# Patient Record
Sex: MALE | Race: WHITE | NOT HISPANIC OR LATINO | Employment: FULL TIME | URBAN - METROPOLITAN AREA
[De-identification: names, ages, dates, MRNs, and addresses within clinical notes are randomized per-mention and may not be internally consistent; named-entity substitution may affect disease eponyms.]

---

## 2023-08-16 ENCOUNTER — HOSPITAL ENCOUNTER (INPATIENT)
Facility: MEDICAL CENTER | Age: 53
LOS: 5 days | DRG: 908 | End: 2023-08-21
Attending: EMERGENCY MEDICINE | Admitting: INTERNAL MEDICINE
Payer: COMMERCIAL

## 2023-08-16 ENCOUNTER — APPOINTMENT (OUTPATIENT)
Dept: RADIOLOGY | Facility: MEDICAL CENTER | Age: 53
DRG: 908 | End: 2023-08-16
Attending: EMERGENCY MEDICINE
Payer: COMMERCIAL

## 2023-08-16 DIAGNOSIS — I46.9 CARDIAC ARREST (HCC): ICD-10-CM

## 2023-08-16 DIAGNOSIS — I50.20 HFREF (HEART FAILURE WITH REDUCED EJECTION FRACTION) (HCC): ICD-10-CM

## 2023-08-16 DIAGNOSIS — L03.90 CELLULITIS, UNSPECIFIED CELLULITIS SITE: ICD-10-CM

## 2023-08-16 DIAGNOSIS — T82.7XXA PACEMAKER INFECTION, INITIAL ENCOUNTER (HCC): ICD-10-CM

## 2023-08-16 PROBLEM — N18.30 CHRONIC KIDNEY DISEASE (CKD), STAGE III (MODERATE): Status: ACTIVE | Noted: 2023-08-16

## 2023-08-16 PROBLEM — I10 HTN (HYPERTENSION): Status: ACTIVE | Noted: 2023-08-16

## 2023-08-16 PROBLEM — E87.1 HYPONATREMIA: Status: ACTIVE | Noted: 2023-08-16

## 2023-08-16 PROBLEM — Z71.89 ACP (ADVANCE CARE PLANNING): Status: ACTIVE | Noted: 2023-08-16

## 2023-08-16 LAB
ALBUMIN SERPL BCP-MCNC: 3.3 G/DL (ref 3.2–4.9)
ALBUMIN/GLOB SERPL: 1 G/DL
ALP SERPL-CCNC: 78 U/L (ref 30–99)
ALT SERPL-CCNC: 19 U/L (ref 2–50)
ANION GAP SERPL CALC-SCNC: 14 MMOL/L (ref 7–16)
AST SERPL-CCNC: 21 U/L (ref 12–45)
BASOPHILS # BLD AUTO: 0.3 % (ref 0–1.8)
BASOPHILS # BLD: 0.04 K/UL (ref 0–0.12)
BILIRUB SERPL-MCNC: 0.7 MG/DL (ref 0.1–1.5)
BUN SERPL-MCNC: 29 MG/DL (ref 8–22)
CALCIUM ALBUM COR SERPL-MCNC: 9.3 MG/DL (ref 8.5–10.5)
CALCIUM SERPL-MCNC: 8.7 MG/DL (ref 8.5–10.5)
CHLORIDE SERPL-SCNC: 97 MMOL/L (ref 96–112)
CO2 SERPL-SCNC: 17 MMOL/L (ref 20–33)
CREAT SERPL-MCNC: 1.03 MG/DL (ref 0.5–1.4)
CRP SERPL HS-MCNC: 15.36 MG/DL (ref 0–0.75)
EOSINOPHIL # BLD AUTO: 0.07 K/UL (ref 0–0.51)
EOSINOPHIL NFR BLD: 0.5 % (ref 0–6.9)
ERYTHROCYTE [DISTWIDTH] IN BLOOD BY AUTOMATED COUNT: 38.4 FL (ref 35.9–50)
ERYTHROCYTE [SEDIMENTATION RATE] IN BLOOD BY WESTERGREN METHOD: 16 MM/HOUR (ref 0–20)
GFR SERPLBLD CREATININE-BSD FMLA CKD-EPI: 86 ML/MIN/1.73 M 2
GLOBULIN SER CALC-MCNC: 3.3 G/DL (ref 1.9–3.5)
GLUCOSE SERPL-MCNC: 70 MG/DL (ref 65–99)
GRAM STN SPEC: NORMAL
HCT VFR BLD AUTO: 47.2 % (ref 42–52)
HGB BLD-MCNC: 15.8 G/DL (ref 14–18)
IMM GRANULOCYTES # BLD AUTO: 0.07 K/UL (ref 0–0.11)
IMM GRANULOCYTES NFR BLD AUTO: 0.5 % (ref 0–0.9)
LACTATE SERPL-SCNC: 0.8 MMOL/L (ref 0.5–2)
LYMPHOCYTES # BLD AUTO: 2.22 K/UL (ref 1–4.8)
LYMPHOCYTES NFR BLD: 17.4 % (ref 22–41)
MCH RBC QN AUTO: 28.7 PG (ref 27–33)
MCHC RBC AUTO-ENTMCNC: 33.5 G/DL (ref 32.3–36.5)
MCV RBC AUTO: 85.8 FL (ref 81.4–97.8)
MONOCYTES # BLD AUTO: 1.44 K/UL (ref 0–0.85)
MONOCYTES NFR BLD AUTO: 11.3 % (ref 0–13.4)
NEUTROPHILS # BLD AUTO: 8.95 K/UL (ref 1.82–7.42)
NEUTROPHILS NFR BLD: 70 % (ref 44–72)
NRBC # BLD AUTO: 0 K/UL
NRBC BLD-RTO: 0 /100 WBC (ref 0–0.2)
NT-PROBNP SERPL IA-MCNC: <36 PG/ML (ref 0–125)
PLATELET # BLD AUTO: 201 K/UL (ref 164–446)
PMV BLD AUTO: 9.3 FL (ref 9–12.9)
POTASSIUM SERPL-SCNC: 4.3 MMOL/L (ref 3.6–5.5)
PROT SERPL-MCNC: 6.6 G/DL (ref 6–8.2)
RBC # BLD AUTO: 5.5 M/UL (ref 4.7–6.1)
SCCMEC + MECA PNL NOSE NAA+PROBE: NEGATIVE
SIGNIFICANT IND 70042: NORMAL
SITE SITE: NORMAL
SODIUM SERPL-SCNC: 128 MMOL/L (ref 135–145)
SOURCE SOURCE: NORMAL
WBC # BLD AUTO: 12.8 K/UL (ref 4.8–10.8)

## 2023-08-16 PROCEDURE — 96365 THER/PROPH/DIAG IV INF INIT: CPT

## 2023-08-16 PROCEDURE — 700105 HCHG RX REV CODE 258: Performed by: EMERGENCY MEDICINE

## 2023-08-16 PROCEDURE — 83880 ASSAY OF NATRIURETIC PEPTIDE: CPT

## 2023-08-16 PROCEDURE — 80053 COMPREHEN METABOLIC PANEL: CPT

## 2023-08-16 PROCEDURE — 87205 SMEAR GRAM STAIN: CPT

## 2023-08-16 PROCEDURE — 99223 1ST HOSP IP/OBS HIGH 75: CPT | Performed by: INTERNAL MEDICINE

## 2023-08-16 PROCEDURE — 87077 CULTURE AEROBIC IDENTIFY: CPT

## 2023-08-16 PROCEDURE — 770020 HCHG ROOM/CARE - TELE (206)

## 2023-08-16 PROCEDURE — 93005 ELECTROCARDIOGRAM TRACING: CPT | Performed by: EMERGENCY MEDICINE

## 2023-08-16 PROCEDURE — 83605 ASSAY OF LACTIC ACID: CPT

## 2023-08-16 PROCEDURE — 700111 HCHG RX REV CODE 636 W/ 250 OVERRIDE (IP): Mod: JZ | Performed by: EMERGENCY MEDICINE

## 2023-08-16 PROCEDURE — 700102 HCHG RX REV CODE 250 W/ 637 OVERRIDE(OP)

## 2023-08-16 PROCEDURE — 99497 ADVNCD CARE PLAN 30 MIN: CPT | Performed by: INTERNAL MEDICINE

## 2023-08-16 PROCEDURE — 700105 HCHG RX REV CODE 258: Performed by: NURSE PRACTITIONER

## 2023-08-16 PROCEDURE — 85652 RBC SED RATE AUTOMATED: CPT

## 2023-08-16 PROCEDURE — 87040 BLOOD CULTURE FOR BACTERIA: CPT

## 2023-08-16 PROCEDURE — 36415 COLL VENOUS BLD VENIPUNCTURE: CPT

## 2023-08-16 PROCEDURE — A9270 NON-COVERED ITEM OR SERVICE: HCPCS | Performed by: INTERNAL MEDICINE

## 2023-08-16 PROCEDURE — 87641 MR-STAPH DNA AMP PROBE: CPT

## 2023-08-16 PROCEDURE — A9270 NON-COVERED ITEM OR SERVICE: HCPCS

## 2023-08-16 PROCEDURE — 85025 COMPLETE CBC W/AUTO DIFF WBC: CPT

## 2023-08-16 PROCEDURE — 700111 HCHG RX REV CODE 636 W/ 250 OVERRIDE (IP): Mod: JZ | Performed by: NURSE PRACTITIONER

## 2023-08-16 PROCEDURE — 71045 X-RAY EXAM CHEST 1 VIEW: CPT

## 2023-08-16 PROCEDURE — 99285 EMERGENCY DEPT VISIT HI MDM: CPT

## 2023-08-16 PROCEDURE — 86140 C-REACTIVE PROTEIN: CPT

## 2023-08-16 PROCEDURE — 87070 CULTURE OTHR SPECIMN AEROBIC: CPT

## 2023-08-16 PROCEDURE — 700102 HCHG RX REV CODE 250 W/ 637 OVERRIDE(OP): Performed by: INTERNAL MEDICINE

## 2023-08-16 RX ORDER — ONDANSETRON 2 MG/ML
4 INJECTION INTRAMUSCULAR; INTRAVENOUS EVERY 4 HOURS PRN
Status: DISCONTINUED | OUTPATIENT
Start: 2023-08-16 | End: 2023-08-21 | Stop reason: HOSPADM

## 2023-08-16 RX ORDER — MELOXICAM 15 MG/1
15 TABLET ORAL EVERY MORNING
COMMUNITY

## 2023-08-16 RX ORDER — ATORVASTATIN CALCIUM 40 MG/1
40 TABLET, FILM COATED ORAL NIGHTLY
Status: DISCONTINUED | OUTPATIENT
Start: 2023-08-16 | End: 2023-08-21 | Stop reason: HOSPADM

## 2023-08-16 RX ORDER — LISINOPRIL 40 MG/1
40 TABLET ORAL EVERY MORNING
Status: DISCONTINUED | OUTPATIENT
Start: 2023-08-17 | End: 2023-08-16

## 2023-08-16 RX ORDER — CARVEDILOL 12.5 MG/1
12.5 TABLET ORAL 2 TIMES DAILY WITH MEALS
Status: DISCONTINUED | OUTPATIENT
Start: 2023-08-16 | End: 2023-08-21 | Stop reason: HOSPADM

## 2023-08-16 RX ORDER — PROCHLORPERAZINE EDISYLATE 5 MG/ML
5-10 INJECTION INTRAMUSCULAR; INTRAVENOUS EVERY 4 HOURS PRN
Status: DISCONTINUED | OUTPATIENT
Start: 2023-08-16 | End: 2023-08-21 | Stop reason: HOSPADM

## 2023-08-16 RX ORDER — ONDANSETRON 4 MG/1
4 TABLET, ORALLY DISINTEGRATING ORAL EVERY 4 HOURS PRN
Status: DISCONTINUED | OUTPATIENT
Start: 2023-08-16 | End: 2023-08-21 | Stop reason: HOSPADM

## 2023-08-16 RX ORDER — FUROSEMIDE 20 MG/1
20 TABLET ORAL EVERY MORNING
COMMUNITY

## 2023-08-16 RX ORDER — AMOXICILLIN 250 MG
2 CAPSULE ORAL 2 TIMES DAILY
Status: DISCONTINUED | OUTPATIENT
Start: 2023-08-16 | End: 2023-08-21 | Stop reason: HOSPADM

## 2023-08-16 RX ORDER — OXYCODONE HYDROCHLORIDE 10 MG/1
10 TABLET ORAL
Status: DISCONTINUED | OUTPATIENT
Start: 2023-08-16 | End: 2023-08-21 | Stop reason: HOSPADM

## 2023-08-16 RX ORDER — ACETAMINOPHEN 500 MG
1000 TABLET ORAL EVERY 6 HOURS PRN
Status: DISCONTINUED | OUTPATIENT
Start: 2023-08-22 | End: 2023-08-21 | Stop reason: HOSPADM

## 2023-08-16 RX ORDER — LISINOPRIL 40 MG/1
40 TABLET ORAL EVERY MORNING
COMMUNITY

## 2023-08-16 RX ORDER — HYDRALAZINE HYDROCHLORIDE 20 MG/ML
10 INJECTION INTRAMUSCULAR; INTRAVENOUS EVERY 4 HOURS PRN
Status: DISCONTINUED | OUTPATIENT
Start: 2023-08-16 | End: 2023-08-21 | Stop reason: HOSPADM

## 2023-08-16 RX ORDER — PROMETHAZINE HYDROCHLORIDE 25 MG/1
12.5-25 TABLET ORAL EVERY 4 HOURS PRN
Status: DISCONTINUED | OUTPATIENT
Start: 2023-08-16 | End: 2023-08-21 | Stop reason: HOSPADM

## 2023-08-16 RX ORDER — SPIRONOLACTONE 25 MG/1
25 TABLET ORAL DAILY
COMMUNITY
End: 2023-09-06

## 2023-08-16 RX ORDER — PROMETHAZINE HYDROCHLORIDE 25 MG/1
12.5-25 SUPPOSITORY RECTAL EVERY 4 HOURS PRN
Status: DISCONTINUED | OUTPATIENT
Start: 2023-08-16 | End: 2023-08-21 | Stop reason: HOSPADM

## 2023-08-16 RX ORDER — ACETAMINOPHEN 500 MG
1000 TABLET ORAL EVERY 6 HOURS
Status: DISCONTINUED | OUTPATIENT
Start: 2023-08-17 | End: 2023-08-21 | Stop reason: HOSPADM

## 2023-08-16 RX ORDER — BISACODYL 10 MG
10 SUPPOSITORY, RECTAL RECTAL
Status: DISCONTINUED | OUTPATIENT
Start: 2023-08-16 | End: 2023-08-21 | Stop reason: HOSPADM

## 2023-08-16 RX ORDER — ATORVASTATIN CALCIUM 40 MG/1
40 TABLET, FILM COATED ORAL NIGHTLY
COMMUNITY

## 2023-08-16 RX ORDER — POLYETHYLENE GLYCOL 3350 17 G/17G
1 POWDER, FOR SOLUTION ORAL
Status: DISCONTINUED | OUTPATIENT
Start: 2023-08-16 | End: 2023-08-21 | Stop reason: HOSPADM

## 2023-08-16 RX ORDER — CARVEDILOL 12.5 MG/1
12.5 TABLET ORAL 2 TIMES DAILY WITH MEALS
COMMUNITY

## 2023-08-16 RX ORDER — SPIRONOLACTONE 25 MG/1
25 TABLET ORAL DAILY
Status: DISCONTINUED | OUTPATIENT
Start: 2023-08-17 | End: 2023-08-21 | Stop reason: HOSPADM

## 2023-08-16 RX ORDER — HYDROMORPHONE HYDROCHLORIDE 1 MG/ML
0.5 INJECTION, SOLUTION INTRAMUSCULAR; INTRAVENOUS; SUBCUTANEOUS
Status: DISCONTINUED | OUTPATIENT
Start: 2023-08-16 | End: 2023-08-21 | Stop reason: HOSPADM

## 2023-08-16 RX ORDER — OXYCODONE HYDROCHLORIDE 5 MG/1
5 TABLET ORAL
Status: DISCONTINUED | OUTPATIENT
Start: 2023-08-16 | End: 2023-08-21 | Stop reason: HOSPADM

## 2023-08-16 RX ORDER — CHOLECALCIFEROL (VITAMIN D3) 125 MCG
5 CAPSULE ORAL ONCE
Status: COMPLETED | OUTPATIENT
Start: 2023-08-16 | End: 2023-08-16

## 2023-08-16 RX ADMIN — Medication 5 MG: at 23:36

## 2023-08-16 RX ADMIN — PIPERACILLIN AND TAZOBACTAM 3.38 G: 3; .375 INJECTION, POWDER, FOR SOLUTION INTRAVENOUS at 22:29

## 2023-08-16 RX ADMIN — CARVEDILOL 12.5 MG: 12.5 TABLET, FILM COATED ORAL at 20:02

## 2023-08-16 RX ADMIN — ATORVASTATIN CALCIUM 40 MG: 40 TABLET, FILM COATED ORAL at 20:04

## 2023-08-16 RX ADMIN — ACETAMINOPHEN 1000 MG: 500 TABLET, FILM COATED ORAL at 23:36

## 2023-08-16 RX ADMIN — PIPERACILLIN AND TAZOBACTAM 3.38 G: 3; .375 INJECTION, POWDER, FOR SOLUTION INTRAVENOUS at 18:40

## 2023-08-16 ASSESSMENT — ENCOUNTER SYMPTOMS
FEVER: 0
SHORTNESS OF BREATH: 0
DIZZINESS: 0
HEADACHES: 0
COUGH: 0
CHILLS: 0

## 2023-08-16 ASSESSMENT — LIFESTYLE VARIABLES
HAVE PEOPLE ANNOYED YOU BY CRITICIZING YOUR DRINKING: NO
TOTAL SCORE: 0
HAVE YOU EVER FELT YOU SHOULD CUT DOWN ON YOUR DRINKING: NO
TOTAL SCORE: 0
TOTAL SCORE: 0
ON A TYPICAL DAY WHEN YOU DRINK ALCOHOL HOW MANY DRINKS DO YOU HAVE: 0
ALCOHOL_USE: NO
EVER FELT BAD OR GUILTY ABOUT YOUR DRINKING: NO
HOW MANY TIMES IN THE PAST YEAR HAVE YOU HAD 5 OR MORE DRINKS IN A DAY: 0
EVER HAD A DRINK FIRST THING IN THE MORNING TO STEADY YOUR NERVES TO GET RID OF A HANGOVER: NO
DOES PATIENT WANT TO STOP DRINKING: NO
AVERAGE NUMBER OF DAYS PER WEEK YOU HAVE A DRINK CONTAINING ALCOHOL: 0
CONSUMPTION TOTAL: NEGATIVE

## 2023-08-16 ASSESSMENT — COGNITIVE AND FUNCTIONAL STATUS - GENERAL
DAILY ACTIVITIY SCORE: 23
SUGGESTED CMS G CODE MODIFIER DAILY ACTIVITY: CI
MOBILITY SCORE: 24
DRESSING REGULAR UPPER BODY CLOTHING: A LITTLE
SUGGESTED CMS G CODE MODIFIER MOBILITY: CH

## 2023-08-16 ASSESSMENT — PATIENT HEALTH QUESTIONNAIRE - PHQ9
1. LITTLE INTEREST OR PLEASURE IN DOING THINGS: NOT AT ALL
2. FEELING DOWN, DEPRESSED, IRRITABLE, OR HOPELESS: NOT AT ALL
SUM OF ALL RESPONSES TO PHQ9 QUESTIONS 1 AND 2: 0

## 2023-08-16 ASSESSMENT — PAIN DESCRIPTION - PAIN TYPE
TYPE: ACUTE PAIN
TYPE: ACUTE PAIN

## 2023-08-16 ASSESSMENT — FIBROSIS 4 INDEX
FIB4 SCORE: 1.29
FIB4 SCORE: 1.29

## 2023-08-16 NOTE — ED PROVIDER NOTES
ER Provider Note    Scribed for Tatiana Isaacs M.d. by Ruel Will. 8/16/2023  4:06 PM    Primary Care Provider: No primary care provider reported.   CHIEF COMPLAINT  Chief Complaint   Patient presents with    Sent by MD     [facility]. Pt had pacemaker replaced beginning of July and has had ongoing issues, re occurrent issues since replacement      EXTERNAL RECORDS REVIEWED  Other [facility].  No records for review.  Intake note reveals that the pacemaker was inserted 2017 in [place].  The generator was replaced in July and has been draining purulent fluid since then.  Is reported the pacer is currently working.  The patient is currently paced.  White count was 14,000.  CRP was 17.  Lactic acid level and sed rate were normal.  It was noted the patient has been in the ER 3 times since July 6 for infection at the pacer site.  He was given daptomycin.  It was determined that patient needs old leads removed and was sent to [facility] for higher level of care.    [facility] was [facility].  [facility] with Dr. Deal, cardiologist here at [facility].  Given the age of the initial device placement leads in 2017, the patient would be best transferred to [facility] where they can extract the leads if needed ([facility] does not have the equipment needed to do so.)  It was also felt that it would be best to have CT surgery backup.  Patient has history of cardiac arrest, MI CMO, EF recovered.  He also has nonobstructive CAD 40% M LAD, 40% ramus, and 30% left circumflex with history of angio September 2016.  [facility] and are currently pending.  [facility]  This was reviewed by myself.  There are obvious pacer spikes to suggest the pacemaker is working.    HPI/ROS  LIMITATION TO HISTORY   Select: : None    OUTSIDE HISTORIAN(S):  None    Ricky Galdamez is a 53 y.o. male who  "presents to the ED via [facility], onset approximately one month ago. The patient reports he had a pacemaker generator replaced on 7/6/23. Shortly after this he noticed some drainage from the incision site, prompting him to [facility] where Tegaderm was placed over the area. The patient notes this appeared to resolve his symptoms for approximately ten days after the most recent application. However, today while at work the patient reports the wound \"leaking\" through his shirt. This prompted him again to present to the [facility] ED again where he was treated with Daptomyocin. It was then determined by Cardiology at the [facility]  that the patient needed old leads removed, so he was sent [facility] for higher level of care. While in the [facility] ED he denies any fever, chills, nausea, vomiting, or generalized body aches. He notes he has had a pacemaker in place for approximately 7 years and the recent surgery was to replace the generator in his pacemaker. He initially had the pacemaker placed as during a cholecystectomy procedure approximately 12 years ago there was a complication with the anesthesia and he coded for several minutes resulting in decreased cardiac function. He has no known history of diabetes.    PAST MEDICAL HISTORY  Past Medical History:   Diagnosis Date    Chronic pain     Congestive heart failure (HCC)     Hyperlipidemia     Hypertension     Pacemaker        SURGICAL HISTORY  Cholecystectomy     FAMILY HISTORY  No family history reported.     SOCIAL HISTORY   reports that he has never smoked. He has never used smokeless tobacco. He reports current alcohol use. He reports that he does not use drugs.    CURRENT MEDICATIONS  No current outpatient medications     ALLERGIES  Patient has no known allergies.    PHYSICAL EXAM  /75   Pulse 95   Temp 37.3 °C (99.1 °F) (Temporal)   Resp 20   Wt 100 kg (220 lb 14.4 oz)   SpO2 96% "   Constitutional: Well developed, well nourished; No acute distress; Non-toxic appearance.   HENT: Normocephalic, atraumatic; Bilateral external ears normal; slightly dry mucous membranes.  Eyes: PERRL, EOMI, Conjunctiva normal. No discharge.   Neck:  Supple, nontender midline; No stridor; No nuchal rigidity.   Lymphatic: No cervical lymphadenopathy noted.   Cardiovascular: Regular rate and rhythm without murmurs, rubs, or gallop.   Thorax & Lungs: No respiratory distress, breath sounds clear to auscultation bilaterally without wheezing, rales or rhonchi.  Patient has an obvious area of swelling and erythema in the area of the pacemaker pouch on the left anterior chest wall.  There is open wound over the pacemaker site which is draining purulent fluid.  No crepitus or subcu air.  Abdomen: Soft, nontender, bowel sounds normal. No obvious masses; No pulsatile masses; no rebound, guarding, or peritoneal signs.   Skin: Good color; warm and dry without rash or petechia.  Back: Nontender, No CVA tenderness.   Extremities: Distal radial, dorsalis pedis, posterior tibial pulses are equal bilaterally; No edema; Nontender calves or saphenous, No cyanosis, No clubbing.   Musculoskeletal: Good range of motion in all major joints. No tenderness to palpation or major deformities noted.   Neurologic: Alert & oriented x 4, clear speech.       DIAGNOSTIC STUDIES    Labs:   Results for orders placed or performed during the hospital encounter of 08/16/23   CBC With Differential   Result Value Ref Range    WBC 12.8 (H) 4.8 - 10.8 K/uL    RBC 5.50 4.70 - 6.10 M/uL    Hemoglobin 15.8 14.0 - 18.0 g/dL    Hematocrit 47.2 42.0 - 52.0 %    MCV 85.8 81.4 - 97.8 fL    MCH 28.7 27.0 - 33.0 pg    MCHC 33.5 32.3 - 36.5 g/dL    RDW 38.4 35.9 - 50.0 fL    Platelet Count 201 164 - 446 K/uL    MPV 9.3 9.0 - 12.9 fL    Neutrophils-Polys 70.00 44.00 - 72.00 %    Lymphocytes 17.40 (L) 22.00 - 41.00 %    Monocytes 11.30 0.00 - 13.40 %    Eosinophils 0.50  0.00 - 6.90 %    Basophils 0.30 0.00 - 1.80 %    Immature Granulocytes 0.50 0.00 - 0.90 %    Nucleated RBC 0.00 0.00 - 0.20 /100 WBC    Neutrophils (Absolute) 8.95 (H) 1.82 - 7.42 K/uL    Lymphs (Absolute) 2.22 1.00 - 4.80 K/uL    Monos (Absolute) 1.44 (H) 0.00 - 0.85 K/uL    Eos (Absolute) 0.07 0.00 - 0.51 K/uL    Baso (Absolute) 0.04 0.00 - 0.12 K/uL    Immature Granulocytes (abs) 0.07 0.00 - 0.11 K/uL    NRBC (Absolute) 0.00 K/uL   Comp Metabolic Panel   Result Value Ref Range    Sodium 128 (L) 135 - 145 mmol/L    Potassium 4.3 3.6 - 5.5 mmol/L    Chloride 97 96 - 112 mmol/L    Co2 17 (L) 20 - 33 mmol/L    Anion Gap 14.0 7.0 - 16.0    Glucose 70 65 - 99 mg/dL    Bun 29 (H) 8 - 22 mg/dL    Creatinine 1.03 0.50 - 1.40 mg/dL    Calcium 8.7 8.5 - 10.5 mg/dL    Correct Calcium 9.3 8.5 - 10.5 mg/dL    AST(SGOT) 21 12 - 45 U/L    ALT(SGPT) 19 2 - 50 U/L    Alkaline Phosphatase 78 30 - 99 U/L    Total Bilirubin 0.7 0.1 - 1.5 mg/dL    Albumin 3.3 3.2 - 4.9 g/dL    Total Protein 6.6 6.0 - 8.2 g/dL    Globulin 3.3 1.9 - 3.5 g/dL    A-G Ratio 1.0 g/dL   Lactic Acid   Result Value Ref Range    Lactic Acid 0.8 0.5 - 2.0 mmol/L   Sed Rate   Result Value Ref Range    Sed Rate Westergren 16 0 - 20 mm/hour   C Reactive Protein Quantitative (Non-Cardiac)   Result Value Ref Range    Stat C-Reactive Protein 15.36 (H) 0.00 - 0.75 mg/dL   Culture Wound With Gram Stain    Specimen: Abscess; Wound   Result Value Ref Range    Significant Indicator NEG     Source WND     Site Left Chest     Culture Result -     Gram Stain Result Few WBCs.  No organisms seen.      ESTIMATED GFR   Result Value Ref Range    GFR (CKD-EPI) 86 >60 mL/min/1.73 m 2   proBrain Natriuretic Peptide, NT   Result Value Ref Range    NT-proBNP <36 0 - 125 pg/mL   MRSA By PCR (Amp)    Specimen: Nares; Respirate   Result Value Ref Range    MRSA by PCR Negative Negative   GRAM STAIN    Specimen: Wound   Result Value Ref Range    Significant Indicator .     Source WND      Site Left Chest     Gram Stain Result Few WBCs.  No organisms seen.           EKG:   I have independently interpreted this EKG as above.     Radiology:   The attending emergency physician has independently interpreted the diagnostic imaging associated with this visit and am waiting the final reading from the radiologist.     Preliminary interpretation is a follows: Chest x-ray is negative for obvious infiltrates.  Pacemaker present in the left chest wall.  This leads go into the heart.    Radiologist interpretation:     DX-CHEST-PORTABLE (1 VIEW)   Final Result      1.  No acute cardiac or pulmonary abnormalities are identified.           COURSE & MEDICAL DECISION MAKING     ED Observation Status? No; Patient does not meet criteria for ED Observation.     INITIAL ASSESSMENT, COURSE AND PLAN  Care Narrative: Patient presents to the ER as a transfer from the [facility] for a pacemaker infection.  The [facility]23.  He describes drainage from the site fairly shortly after the replacement.  He has been to the [facility] ER 3 times for drainage from that site.  Today he presented with worsening drainage.  He has obvious purulent material coming from a wound overlying the pacemaker.  There is surrounding swelling and erythema with some mild tenderness.  The patient denies fevers or chills.  No nausea or vomiting.  No shortness of breath.  The pacemaker was initially placed back in 2017 after he had a cardiac arrest on the surgery table while he was getting a cholecystectomy done at the [facility].  He said that his EF was down to 15% after the cardiac arrest.  He said it is now up to 50%.  [facility] shows a paced rhythm so the pacemaker is working.  He was given [facility]apt[facility] at the [facility].  After discussion with the pharmacist, no cultures were done at the [facility] during any of the ER visits and its unclear exactly why they chose daptomycin.  Nonetheless, we have  held the vancomycin I ordered since patient had daptomycin and gave the patient some Zosyn.  Blood cultures were done at the [facility] and have been reordered and reobtained here at [facility].  CRP is elevated at 15.  Sed rate is 12.5.  He is afebrile here in the ER.    4:06 PM - Patient seen and examined at bedside. Discussed plan of care, including a diagnostic work and consulting with cardiology prior to hospitalization. Patient agrees to the plan of care. The patient will be medicated with Vancomycin per pharmacy and Zosyn 3.375 g in  mL IVPB. Ordered for labs and imaging to evaluate his symptoms.      4:56 PM Paged Cardiology.    4:58 PM Paged Hospitalist.        5:10 PM I discussed the patient's case and the above findings with Dr. Ramirez (Hospitalist) who agrees to evaluate the patient for hospitalization.     5:15 PM I discussed the patient's case and the above findings with Dr. Piedra (Cardiology) who will evaluate the patient.        ADDITIONAL PROBLEM LIST  Problem #1: Infected pacemaker site left chest wall    DISPOSITION AND DISCUSSIONS  I have discussed management of the patient with the following physicians and PAM's:  Dr. Piedra (Cardiology) and Dr. Ramirez (Hospitalist).     Discussion of management with other hospitals or appropriate source(s): Pharmacy for appropriate antibiotics administration  .  Issa, pharmacist, contacted the [facility].  No cultures had been done.  They gave the patient some daptomycin around 2:00 this afternoon.  We will give the patient Zosyn and hold off on the vancomycin for now.    Escalation of care considered, and ultimately not performed: diagnostic imaging.  Patient has not had fevers or chills.  Lactic acid level 0.8.  Procalcitonin at the [facility] was normal at 0.15.  Patient has been describing drainage and infection from the pacemaker site over the last 1 month.  Vitals are stable.  He is afebrile.  At this time I do not think he has necrotizing fasciitis and so I  do not think he needs a CT scan at this time.    Barriers to care at this time, including but not limited to:  None noted .     Decision tools and prescription drugs considered including, but not limited to: Antibiotics patient received [facility]apt[facility] the [facility].  We will give the patient Zosyn here.  He is covered with the daptomycin for the next 24 hours. .    DISPOSITION:  Patient will be hospitalized by Dr. Ramirez in guarded condition.     FINAL DIAGNOSIS  1. Pacemaker infection, initial encounter (HCC) Acute   2. Cellulitis, unspecified cellulitis site Acute       This dictation has been created using voice recognition software. The accuracy of the dictation is limited by the abilities of the software. I expect there may be some errors of grammar and possibly content. I made every attempt to manually correct the errors within my dictation. However, errors related to voice recognition software may still exist and should be interpreted within the appropriate context.    Ruel CANO (Dhavalibe), am scribing for, and in the presence of, Tatiana Isaacs M.D..    Electronically signed by: Ruel Will (Shubham), 8/16/2023    ITatiana M.D. personally performed the services described in this documentation, as scribed by Ruel Will in my presence, and it is both accurate and complete.      The note accurately reflects work and decisions made by me.  Tatiana Isaacs M.D.  8/16/2023  11:57 PM

## 2023-08-16 NOTE — ED TRIAGE NOTES
Chief Complaint   Patient presents with    Sent by MD     [facility]. Pt had pacemaker replaced beginning of July and has had ongoing issues, re occurrent issues since replacement      /75   Pulse 95   Temp 37.3 °C (99.1 °F) (Temporal)   Resp 20   Wt 100 kg (220 lb 14.4 oz)   SpO2 96%     [facility]. Pt states it began beginning of July when it was replaced. Pt has had pacemaker for approx 7 years total.   Arrives w/ transfer packet, A&O x4, VSS  [facility] gave pt Daptomycin abx PTA

## 2023-08-17 ENCOUNTER — APPOINTMENT (OUTPATIENT)
Dept: CARDIOLOGY | Facility: MEDICAL CENTER | Age: 53
DRG: 908 | End: 2023-08-17
Attending: NURSE PRACTITIONER
Payer: COMMERCIAL

## 2023-08-17 LAB
ABO + RH BLD: NORMAL
ANION GAP SERPL CALC-SCNC: 11 MMOL/L (ref 7–16)
BASOPHILS # BLD AUTO: 0.4 % (ref 0–1.8)
BASOPHILS # BLD: 0.05 K/UL (ref 0–0.12)
BUN SERPL-MCNC: 28 MG/DL (ref 8–22)
CALCIUM SERPL-MCNC: 8.6 MG/DL (ref 8.5–10.5)
CHLORIDE SERPL-SCNC: 99 MMOL/L (ref 96–112)
CO2 SERPL-SCNC: 22 MMOL/L (ref 20–33)
CREAT SERPL-MCNC: 1.36 MG/DL (ref 0.5–1.4)
EOSINOPHIL # BLD AUTO: 0.16 K/UL (ref 0–0.51)
EOSINOPHIL NFR BLD: 1.4 % (ref 0–6.9)
ERYTHROCYTE [DISTWIDTH] IN BLOOD BY AUTOMATED COUNT: 39.4 FL (ref 35.9–50)
GFR SERPLBLD CREATININE-BSD FMLA CKD-EPI: 62 ML/MIN/1.73 M 2
GLUCOSE SERPL-MCNC: 108 MG/DL (ref 65–99)
HCT VFR BLD AUTO: 46.9 % (ref 42–52)
HGB BLD-MCNC: 15 G/DL (ref 14–18)
IMM GRANULOCYTES # BLD AUTO: 0.05 K/UL (ref 0–0.11)
IMM GRANULOCYTES NFR BLD AUTO: 0.4 % (ref 0–0.9)
LV EJECT FRACT  99904: 60
LV EJECT FRACT MOD 2C 99903: 55.06
LV EJECT FRACT MOD 4C 99902: 48.33
LV EJECT FRACT MOD BP 99901: 50.97
LYMPHOCYTES # BLD AUTO: 2.57 K/UL (ref 1–4.8)
LYMPHOCYTES NFR BLD: 22.7 % (ref 22–41)
MCH RBC QN AUTO: 28.3 PG (ref 27–33)
MCHC RBC AUTO-ENTMCNC: 32 G/DL (ref 32.3–36.5)
MCV RBC AUTO: 88.5 FL (ref 81.4–97.8)
MONOCYTES # BLD AUTO: 1.55 K/UL (ref 0–0.85)
MONOCYTES NFR BLD AUTO: 13.7 % (ref 0–13.4)
NEUTROPHILS # BLD AUTO: 6.95 K/UL (ref 1.82–7.42)
NEUTROPHILS NFR BLD: 61.4 % (ref 44–72)
NRBC # BLD AUTO: 0 K/UL
NRBC BLD-RTO: 0 /100 WBC (ref 0–0.2)
PLATELET # BLD AUTO: 202 K/UL (ref 164–446)
PMV BLD AUTO: 9.3 FL (ref 9–12.9)
POTASSIUM SERPL-SCNC: 4 MMOL/L (ref 3.6–5.5)
RBC # BLD AUTO: 5.3 M/UL (ref 4.7–6.1)
SODIUM SERPL-SCNC: 132 MMOL/L (ref 135–145)
WBC # BLD AUTO: 11.3 K/UL (ref 4.8–10.8)

## 2023-08-17 PROCEDURE — 700111 HCHG RX REV CODE 636 W/ 250 OVERRIDE (IP): Performed by: INTERNAL MEDICINE

## 2023-08-17 PROCEDURE — 97602 WOUND(S) CARE NON-SELECTIVE: CPT

## 2023-08-17 PROCEDURE — A9270 NON-COVERED ITEM OR SERVICE: HCPCS | Performed by: INTERNAL MEDICINE

## 2023-08-17 PROCEDURE — 36415 COLL VENOUS BLD VENIPUNCTURE: CPT

## 2023-08-17 PROCEDURE — 700102 HCHG RX REV CODE 250 W/ 637 OVERRIDE(OP)

## 2023-08-17 PROCEDURE — 700105 HCHG RX REV CODE 258: Performed by: INTERNAL MEDICINE

## 2023-08-17 PROCEDURE — 700111 HCHG RX REV CODE 636 W/ 250 OVERRIDE (IP): Mod: JZ | Performed by: STUDENT IN AN ORGANIZED HEALTH CARE EDUCATION/TRAINING PROGRAM

## 2023-08-17 PROCEDURE — 770020 HCHG ROOM/CARE - TELE (206)

## 2023-08-17 PROCEDURE — 99222 1ST HOSP IP/OBS MODERATE 55: CPT | Performed by: INTERNAL MEDICINE

## 2023-08-17 PROCEDURE — A9270 NON-COVERED ITEM OR SERVICE: HCPCS

## 2023-08-17 PROCEDURE — 93306 TTE W/DOPPLER COMPLETE: CPT | Mod: 26 | Performed by: INTERNAL MEDICINE

## 2023-08-17 PROCEDURE — 80048 BASIC METABOLIC PNL TOTAL CA: CPT

## 2023-08-17 PROCEDURE — 93306 TTE W/DOPPLER COMPLETE: CPT

## 2023-08-17 PROCEDURE — 700102 HCHG RX REV CODE 250 W/ 637 OVERRIDE(OP): Performed by: INTERNAL MEDICINE

## 2023-08-17 PROCEDURE — 700105 HCHG RX REV CODE 258: Performed by: NURSE PRACTITIONER

## 2023-08-17 PROCEDURE — 85025 COMPLETE CBC W/AUTO DIFF WBC: CPT

## 2023-08-17 PROCEDURE — 700111 HCHG RX REV CODE 636 W/ 250 OVERRIDE (IP): Mod: JZ | Performed by: NURSE PRACTITIONER

## 2023-08-17 PROCEDURE — 99223 1ST HOSP IP/OBS HIGH 75: CPT | Mod: 57 | Performed by: INTERNAL MEDICINE

## 2023-08-17 PROCEDURE — 99233 SBSQ HOSP IP/OBS HIGH 50: CPT | Performed by: STUDENT IN AN ORGANIZED HEALTH CARE EDUCATION/TRAINING PROGRAM

## 2023-08-17 RX ORDER — LINEZOLID 2 MG/ML
600 INJECTION, SOLUTION INTRAVENOUS EVERY 12 HOURS
Status: DISCONTINUED | OUTPATIENT
Start: 2023-08-17 | End: 2023-08-17

## 2023-08-17 RX ORDER — HEPARIN SODIUM 5000 [USP'U]/ML
5000 INJECTION, SOLUTION INTRAVENOUS; SUBCUTANEOUS EVERY 8 HOURS
Status: DISCONTINUED | OUTPATIENT
Start: 2023-08-17 | End: 2023-08-17

## 2023-08-17 RX ADMIN — LINEZOLID 600 MG: 600 INJECTION, SOLUTION INTRAVENOUS at 09:32

## 2023-08-17 RX ADMIN — PIPERACILLIN AND TAZOBACTAM 3.38 G: 3; .375 INJECTION, POWDER, FOR SOLUTION INTRAVENOUS at 05:34

## 2023-08-17 RX ADMIN — ATORVASTATIN CALCIUM 40 MG: 40 TABLET, FILM COATED ORAL at 21:54

## 2023-08-17 RX ADMIN — CARVEDILOL 12.5 MG: 12.5 TABLET, FILM COATED ORAL at 07:47

## 2023-08-17 RX ADMIN — ACETAMINOPHEN 1000 MG: 500 TABLET, FILM COATED ORAL at 17:45

## 2023-08-17 RX ADMIN — SPIRONOLACTONE 25 MG: 25 TABLET ORAL at 05:34

## 2023-08-17 RX ADMIN — PIPERACILLIN AND TAZOBACTAM 3.38 G: 3; .375 INJECTION, POWDER, FOR SOLUTION INTRAVENOUS at 12:31

## 2023-08-17 RX ADMIN — CEFAZOLIN 2 G: 2 INJECTION, POWDER, FOR SOLUTION INTRAMUSCULAR; INTRAVENOUS at 21:51

## 2023-08-17 RX ADMIN — CARVEDILOL 12.5 MG: 12.5 TABLET, FILM COATED ORAL at 17:45

## 2023-08-17 ASSESSMENT — FIBROSIS 4 INDEX: FIB4 SCORE: 1.29

## 2023-08-17 ASSESSMENT — ENCOUNTER SYMPTOMS
FEVER: 0
SHORTNESS OF BREATH: 0
DIZZINESS: 0
HEADACHES: 0
CHILLS: 0
COUGH: 0

## 2023-08-17 ASSESSMENT — PAIN DESCRIPTION - PAIN TYPE
TYPE: ACUTE PAIN

## 2023-08-17 NOTE — PROGRESS NOTES
Assumed care of patient, bedside report received from Lucy PATRICIO RN. Updated on plan of care, call light within reach and fall precautions are in place and bed lock and in lowest position. Patient instructed to call for assistance before getting out of bed. All questions answered at this time. No other needs.

## 2023-08-17 NOTE — CONSULTS
Electrophysiology Initial Consult Note    DOS: 8/17/2023    Referring physician: Dr Quiñonez    Chief complaint/Reason for consult: Infected ICD    HPI: 52 y/o M with NICM and non-obstructive CAD with CRT-D in situ, he initially had implanted 6-7 years ago in the setting of a post-surgical cardiac arrest and severely reduced LVEF, unclear if LBBB vs heart block but CRT-D implanted, since then improvement in EF. He underwent gen change 1 month [facility]. Noted hematoma and the pocket dehiscence, then purulent discharge. No fevers.    ROS (+ highlighted in bold):  Constitutional: Fevers/chills/fatigue/weightloss  HEENT: Blurry vision/eye pain/sore throat/hearing loss  Respiratory: Shortness of breath/cough  Cardiovascular: Chest pain/palpitations/edema/orthopnea/syncope  GI: Nausea/vomitting/diarrhea  MSK: Arthralgias/myagias/muscle weakness  Skin: Rash/sores  Neurological: Numbness/tremors/vertigo  Endocrine: Excessive thirst/polyuria/cold intolerance/heat intolerance  Psych: Depression/anxiety    Past Medical History:   Diagnosis Date    Chronic pain     Congestive heart failure (HCC)     Hyperlipidemia     Hypertension     Pacemaker        No past surgical history on file.    Social History     Socioeconomic History    Marital status:      Spouse name: Not on file    Number of children: Not on file    Years of education: Not on file    Highest education level: Not on file   Occupational History    Not on file   Tobacco Use    Smoking status: Never    Smokeless tobacco: Never   Substance and Sexual Activity    Alcohol use: Yes     Comment: very rarely    Drug use: Never    Sexual activity: Not on file   Other Topics Concern    Not on file   Social History Narrative    Not on file     Social Determinants of Health     Financial Resource Strain: Not on file   Food Insecurity: Not on file   Transportation Needs: Not on file   Physical Activity: Not on file   Stress: Not on file   Social  Connections: Not on file   Intimate Partner Violence: Not on file   Housing Stability: Not on file       No family history on file.  Denies family history of premature CAD  No Known Allergies    Current Facility-Administered Medications   Medication Dose Route Frequency Provider Last Rate Last Admin    Linezolid (Zyvox) premix 600 mg  600 mg Intravenous Q12HRS Joey Red M.D.   Stopped at 08/17/23 1032    senna-docusate (Pericolace Or Senokot S) 8.6-50 MG per tablet 2 Tablet  2 Tablet Oral BID Jose R Ramirez M.D.        And    polyethylene glycol/lytes (Miralax) PACKET 1 Packet  1 Packet Oral QDAY PRKENDY Ramirez M.D.        And    magnesium hydroxide (Milk Of Magnesia) suspension 30 mL  30 mL Oral QDAY PRKENDY Ramirez M.D.        And    bisacodyl (Dulcolax) suppository 10 mg  10 mg Rectal QDAY BRIGID Ramirez M.D.        hydrALAZINE (Apresoline) injection 10 mg  10 mg Intravenous Q4HRS BRIGID Ramirez M.D.        ondansetron (Zofran) syringe/vial injection 4 mg  4 mg Intravenous Q4HRS BRIGID Ramirez M.D.        ondansetron (Zofran ODT) dispertab 4 mg  4 mg Oral Q4HRS BRIGID Ramirez M.D.        promethazine (Phenergan) tablet 12.5-25 mg  12.5-25 mg Oral Q4HRS BRIGID Ramirez M.D.        promethazine (Phenergan) suppository 12.5-25 mg  12.5-25 mg Rectal Q4HRS BRIGID Ramirez M.D.        prochlorperazine (Compazine) injection 5-10 mg  5-10 mg Intravenous Q4HRS BRIGID Ramirez M.D.        atorvastatin (Lipitor) tablet 40 mg  40 mg Oral Nightly Jose R Ramirez M.D.   40 mg at 08/16/23 2004    carvedilol (Coreg) tablet 12.5 mg  12.5 mg Oral BID WITH MEALS Jose R Ramriez M.D.   12.5 mg at 08/17/23 0747    spironolactone (Aldactone) tablet 25 mg  25 mg Oral DAILY Jose R Ramirez M.D.   25 mg at 08/17/23 0534    piperacillin-tazobactam (Zosyn) 3.375 g in  mL IVPB  3.375 g Intravenous Q8HRS AYSE GravesRAmmyN.   Stopped at 08/17/23 0986  "   acetaminophen (Tylenol) tablet 1,000 mg  1,000 mg Oral Q6HRS Jason Salazar P.A.-C.   1,000 mg at 08/16/23 2336    Followed by    [START ON 8/22/2023] acetaminophen (Tylenol) tablet 1,000 mg  1,000 mg Oral Q6HRS PRN Jason Salazar P.A.-C.        oxyCODONE immediate-release (Roxicodone) tablet 5 mg  5 mg Oral Q3HRS PRN Jason Salazar P.A.-C.        Or    oxyCODONE immediate release (Roxicodone) tablet 10 mg  10 mg Oral Q3HRS PRN Jason Salazar P.A.-C.        Or    HYDROmorphone (Dilaudid) injection 0.5 mg  0.5 mg Intravenous Q3HRS PRN Jason Salazar P.A.-C.           Physical Exam:  Vitals:    08/17/23 0500 08/17/23 0800 08/17/23 0813 08/17/23 1159   BP: 113/66 108/64 105/64 106/57   Pulse: 85  86 87   Resp: 19  18 16   Temp: 36.3 °C (97.3 °F)  36.4 °C (97.5 °F) 37 °C (98.6 °F)   TempSrc: Temporal  Temporal Temporal   SpO2: 95% 95% 94% 96%   Weight:       Height:         General appearance: NAD, conversant   Eyes: anicteric sclerae, moist conjunctivae; no lid-lag; PERRLA  HENT: Atraumatic; oropharynx clear with moist mucous membranes and no mucosal ulcerations; normal hard and soft palate  Neck: Trachea midline; FROM, supple, no thyromegaly or lymphadenopathy  Lungs: CTA, with normal respiratory effort and no intercostal retractions  CV: RRR, no MRGs, no JVD   Abdomen: Soft, non-tender; no masses or HSM  Extremities: No peripheral edema or extremity lymphadenopathy  Skin: Normal temperature, turgor and texture; no rash, ulcers or subcutaneous nodules  Psych: Appropriate affect, alert and oriented to person, place and time    Data:  Lipids:   No results found for: \"CHOLSTRLTOT\", \"TRIGLYCERIDE\", \"HDL\", \"LDL\"     BMP:  Lab Results   Component Value Date/Time    SODIUM 132 (L) 08/17/2023 0127    POTASSIUM 4.0 08/17/2023 0127    CHLORIDE 99 08/17/2023 0127    CO2 22 08/17/2023 0127    GLUCOSE 108 (H) 08/17/2023 0127    BUN 28 (H) 08/17/2023 0127    CREATININE 1.36 08/17/2023 0127    CALCIUM 8.6 08/17/2023 " "0127    ANION 11.0 08/17/2023 0127        TSH:   No results found for: \"TSHULTRASEN\"     THYROXINE (T4):   No results found for: \"FREEDIR\"     CBC:   Lab Results   Component Value Date/Time    WBC 11.3 (H) 08/17/2023 01:27 AM    RBC 5.30 08/17/2023 01:27 AM    HEMOGLOBIN 15.0 08/17/2023 01:27 AM    HEMATOCRIT 46.9 08/17/2023 01:27 AM    MCV 88.5 08/17/2023 01:27 AM    MCH 28.3 08/17/2023 01:27 AM    MCHC 32.0 (L) 08/17/2023 01:27 AM    RDW 39.4 08/17/2023 01:27 AM    PLATELETCT 202 08/17/2023 01:27 AM    MPV 9.3 08/17/2023 01:27 AM    NEUTSPOLYS 61.40 08/17/2023 01:27 AM    LYMPHOCYTES 22.70 08/17/2023 01:27 AM    MONOCYTES 13.70 (H) 08/17/2023 01:27 AM    EOSINOPHILS 1.40 08/17/2023 01:27 AM    BASOPHILS 0.40 08/17/2023 01:27 AM    IMMGRAN 0.40 08/17/2023 01:27 AM    NRBC 0.00 08/17/2023 01:27 AM    NEUTS 6.95 08/17/2023 01:27 AM    LYMPHS 2.57 08/17/2023 01:27 AM    MONOS 1.55 (H) 08/17/2023 01:27 AM    EOS 0.16 08/17/2023 01:27 AM    BASO 0.05 08/17/2023 01:27 AM    IMMGRANAB 0.05 08/17/2023 01:27 AM    NRBCAB 0.00 08/17/2023 01:27 AM        CBC w/o DIFF  Lab Results   Component Value Date/Time    WBC 11.3 (H) 08/17/2023 01:27 AM    RBC 5.30 08/17/2023 01:27 AM    HEMOGLOBIN 15.0 08/17/2023 01:27 AM    MCV 88.5 08/17/2023 01:27 AM    MCH 28.3 08/17/2023 01:27 AM    MCHC 32.0 (L) 08/17/2023 01:27 AM    RDW 39.4 08/17/2023 01:27 AM    MPV 9.3 08/17/2023 01:27 AM       Prior echo/stress results reviewed: Images interpreted by me show mildly reduced LVEF      Telemetry interpreted by me: BIV paced    Impression/Plan:  1. Pacemaker infection, initial encounter (HCC) Acute       2. Cellulitis, unspecified cellulitis site Acute         ICD in situ  NICM  Chronic systolic heart failure  VT arrest  ICD infection    - Will need total system extraction  - ID consult and IV abx  - If he has good underlying rhythm could delay re-implant on right side and potential cover with Life Vest with discharge and outpatient re-implant. " May not need the BiV pacing depending on underlying rhythm and QRS.  - Otherwise if heart block then would need temp wire and re-implant after Bcx clear  - I discussed risks and benefits of ICD extraction including low risk of vessel tear and surgical emergency including death. He understands.    We will follow and arrange extraction, eventual reimplantation of ICD, +/- LV lead     Adebayo Clayton MD  Cardiac Electrophysiology

## 2023-08-17 NOTE — CARE PLAN
The patient is Stable - Low risk of patient condition declining or worsening    Shift Goals  Clinical Goals: monitor pacer site, surgical intervention  Patient Goals: rest and comfort; surgical intervention completion  Family Goals: n/a      Problem: Knowledge Deficit - Standard  Goal: Patient and family/care givers will demonstrate understanding of plan of care, disease process/condition, diagnostic tests and medications  8/17/2023 1211 by Emili Zamorano R.N.  Outcome: Progressing  8/17/2023 1210 by Emili Zamorano R.N.  Outcome: Progressing     Problem: Pain - Standard  Goal: Alleviation of pain or a reduction in pain to the patient’s comfort goal  8/17/2023 1211 by Emili Zamorano R.N.  Outcome: Progressing  8/17/2023 1210 by Emili Zamorano R.N.  Outcome: Progressing     Problem: Skin Integrity  Goal: Skin integrity is maintained or improved  Outcome: Progressing     Problem: Fall Risk  Goal: Patient will remain free from falls  Outcome: Progressing       Progress made toward(s) clinical / shift goals: patient actively engages in the discussion of diagnosis and treatment plan; patient has no complaints of pain; skin integrity preservation measures are in place; fall prevention measures are in place    Patient is not progressing towards the following goals:

## 2023-08-17 NOTE — ASSESSMENT & PLAN NOTE
Cr is 1.5, unclear baseline  Monitor closely, will hold ACE for now and repeat BMP tomorrow, especially given his NPO status for possible intervention

## 2023-08-17 NOTE — H&P
Hospital Medicine History & Physical Note    Date of Service  8/16/2023    Primary Care Physician  No primary care provider on file.    Consultants  cards    Specialist Names: Dr. Deal    Code Status  Full Code    Chief Complaint  Chief Complaint   Patient presents with    Sent by MD     [facility]. Pt had pacemaker replaced beginning of July and has had ongoing issues, re occurrent issues since replacement        History of Presenting Illness  Ricky Galdamez is a 53 y.o. male prior intraoperative cardiac arrest and history of HFrEF status post [employer] biventricular ICD pacemaker placement in 2017 in [place] and known nonobstructive coronary disease (40% mid LAD, 40% ramus and 30% left circumflex done on September 12, 2016 (, hyperlipidemia, hypertension who presented to our hospital with presumed pacemaker infection.  [facility] was concerned for [facility].  He has noted an approximately 1 cm diameter ulceration over the lateral incision site with foul-smelling yellowish purulent discharge.  He also has associated left pectoral cellulitis.  [facility] Dr. Deal who is a electrophysiologist and recommended the patient be transferred to our facility for higher level of care and likely need for explantation of pacemaker device and associated leads and cardiothoracic surgery backup.  Additionally I spoke with pharmacy here at [facility] and apparently the patient received a dose [facility] dapt[facility] reasons [facility].    Personally reviewed his outside [facility] the [facility] including a CBC, procalcitonin, blood cultures, BMP.  He had a sodium of 130 with a normal kidney function.  His white blood cell count was elevated at 14 with a hemoglobin of 16.7 and platelets of 236.  His procalcitonin was 0.15.  And his blood cultures are pending.  I personally reviewed all of these labs and studies on August  16.    Here in the ER he has remained stable and has not received any additional antibiotics.  The ER physician who I spoke with his consulted cardiology who is planning to see the patient this evening.    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  It then changed to clear fluid drainage and then this morning became much more painful with yellowish purulent drainage.  This finally prompted [facility] the [facility] as noted [facility].  Patient currently feels okay with no other associated symptoms.  Cultures of the wound have been obtained here in the ER.    I personally reviewed the CBC and BMP here at [facility] on August 16, 2023.  All my findings are outlined below.    I discussed the plan of care with patient.    Review of Systems  Review of Systems   Constitutional:  Positive for malaise/fatigue. Negative for chills and fever.   Respiratory:  Negative for cough and shortness of breath.    Cardiovascular:  Positive for chest pain. Negative for leg swelling.   Neurological:  Negative for dizziness and headaches.   All other systems reviewed and are negative.      Past Medical History   has a past medical history of Chronic pain, Congestive heart failure (HCC), Hyperlipidemia, Hypertension, and Pacemaker.    Surgical History  Lap theresa  BIV-PPM-ICD device upgrade July 2023     Family History  None applicable    Social History   reports that he has never smoked. He has never used smokeless tobacco. He reports current alcohol use. He reports that he does not use drugs.    Allergies  No Known Allergies    Medications  None       Physical Exam  Temp:  [37.3 °C (99.1 °F)] 37.3 °C (99.1 °F)  Pulse:  [89-98] 96  Resp:  [19-23] 23  BP: ()/(56-75) 95/57  SpO2:  [91 %-96 %] 94 %  Blood Pressure: 98/56   Temperature: 37.3 °C  (99.1 °F)   Pulse: 98   Respiration: 19   Pulse Oximetry: 93 %       Physical Exam  Vitals and nursing note reviewed.   Constitutional:       General: He is not in acute distress.     Appearance: He is well-developed.   HENT:      Head: Normocephalic and atraumatic.      Mouth/Throat:      Pharynx: No oropharyngeal exudate.   Eyes:      General: No scleral icterus.     Pupils: Pupils are equal, round, and reactive to light.   Neck:      Thyroid: No thyromegaly.   Cardiovascular:      Rate and Rhythm: Normal rate and regular rhythm.      Heart sounds: Normal heart sounds. No murmur heard.     Comments: L pectoral ppm pocket with surrounding erythema and clear purulent yellow appearing discharge from the LUQ   Moderate TTP appreciated  Good radial pulses B/L  Pulmonary:      Effort: Pulmonary effort is normal. No respiratory distress.      Breath sounds: Normal breath sounds. No wheezing.   Chest:      Chest wall: Tenderness present.   Abdominal:      General: Bowel sounds are normal. There is no distension.      Palpations: Abdomen is soft.      Tenderness: There is no abdominal tenderness.   Musculoskeletal:         General: No tenderness. Normal range of motion.      Cervical back: Normal range of motion and neck supple.      Right lower leg: No edema.      Left lower leg: No edema.   Skin:     General: Skin is warm and dry.      Findings: No rash.   Neurological:      Mental Status: He is alert and oriented to person, place, and time.      Cranial Nerves: No cranial nerve deficit.         Laboratory:  Recent Labs     08/16/23  1702   WBC 12.8*   RBC 5.50   HEMOGLOBIN 15.8   HEMATOCRIT 47.2   MCV 85.8   MCH 28.7   MCHC 33.5   RDW 38.4   PLATELETCT 201   MPV 9.3     Recent Labs     08/16/23  1702   SODIUM 128*   POTASSIUM 4.3   CHLORIDE 97   CO2 17*   GLUCOSE 70   BUN 29*   CREATININE 1.03   CALCIUM 8.7     Recent Labs     08/16/23  1702   ALTSGPT 19   ASTSGOT 21   ALKPHOSPHAT 78   TBILIRUBIN 0.7   GLUCOSE 70        "  No results for input(s): \"NTPROBNP\" in the last 72 hours.      No results for input(s): \"TROPONINT\" in the last 72 hours.    Imaging:  DX-CHEST-PORTABLE (1 VIEW)   Final Result      1.  No acute cardiac or pulmonary abnormalities are identified.          X-Ray:  My impression is: no clear acute pulmonary abnormalities noted    Assessment/Plan:  Justification for Admission Status  I anticipate this patient will require at least two midnights for appropriate medical management, necessitating inpatient admission because PPM infection    Patient will need a Telemetry bed on EMERGENCY service .  The need is secondary to PPM infection.    * Pacemaker infection, initial encounter ([facility])- (present on admission)  Assessment & Plan  Clearly infected  Transferred from the [facility], Dr Deal of EP to consult  Wound culture done here at [facility]  Blood cultures collected at [facility]  Received IV daptomycin X1 at the [facility], no prior history of low GAGE for MRSA with vancomycin nor VRE  IV vancomycin ordered here  Consult ID and cards in the AM  NPO @ 12 am  Vitals are stable  Likely will full explantation, does not appear to be PPM -dependent at this time    ACP (advance care planning)- (present on admission)  Assessment & Plan  I discussed advance care planning with the patient for at least 14 minutes, including diagnosis, prognosis, plan of care, risks and benefits of any therapies that could be offered, as well as alternatives including palliation and hospice, as appropriate. Patient is confirmed fc/fc    Chronic kidney disease (CKD), stage III (moderate) ([facility])- (present on admission)  Assessment & Plan  Cr is 1.5, unclear baseline  Monitor closely, will hold ACE for now and repeat BMP tomorrow, especially given his NPO status for possible intervention    Hyponatremia- (present on admission)  Assessment & Plan  Mild, trend, no clear symptoms, volume status appears euvolemic  If worsens, consider SIADH work up    HTN (hypertension)- (present " on admission)  Assessment & Plan  Decently controlled  BB, ACE, monitor    HFrEF (heart failure with reduced ejection fraction) ([facility])- (present on admission)  Assessment & Plan  Occurred after intra-operative cardiac arrest while getting lap theresa 5 years ago  Per patient, full LVEF recovery has been achieved  Chronic at this time, no current exacerbation  Continue aldactone, toprol xl  Hold ACE and flozin  Hold lasix for now        VTE prophylaxis: SCDs/TEDs

## 2023-08-17 NOTE — ASSESSMENT & PLAN NOTE
Occurred after intra-operative cardiac arrest while getting lap theresa 5 years ago  Per patient, full LVEF recovery has been achieved  Chronic at this time, no current exacerbation  Continue aldactone, toprol xl  Hold ACE and flozin  Hold lasix for now

## 2023-08-17 NOTE — HOSPITAL COURSE
Ricky Galdamez is a 53 y.o. male prior intraoperative cardiac arrest and history of HFrEF status post [employer] biventricular ICD pacemaker placement in 2017 in [place] and known nonobstructive coronary disease (40% mid LAD, 40% ramus and 30% left circumflex done on September 12, 2016 (, hyperlipidemia, hypertension who presented to our hospital with presumed pacemaker infection.  [facility] was concerned for infection.  He has noted an approximately 1 cm diameter ulceration over the lateral incision site with foul-smelling yellowish purulent discharge.  He also has associated left pectoral cellulitis.  [facility] Dr. Deal who is a electrophysiologist and recommended the patient be transferred to our facility for higher level of care and likely need for explantation of pacemaker device and associated leads and cardiothoracic surgery backup.  Additionally I spoke with pharmacy here at [facility] and apparently the patient received a dose of daptomycin [facility] reasons [facility].     Personally reviewed his outside labs at the [facility] including a CBC, procalcitonin, blood cultures, BMP.  He had a sodium of 130 with a normal kidney function.  His white blood cell count was elevated at 14 with a hemoglobin of 16.7 and platelets of 236.  His procalcitonin was 0.15.  And his blood cultures are pending.  I personally reviewed all of these labs and studies on August 16.     Here in the ER he has remained stable and has not received any additional antibiotics.  The ER physician who I spoke with his consulted cardiology who is planning to see the patient this evening.     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  It then changed to clear fluid drainage and then this morning  became much more painful with yellowish purulent drainage.  This finally prompted him to [facility] to the [facility] as noted above.  Patient currently feels okay with no other associated symptoms.  Cultures of the wound have been obtained here in the ER.

## 2023-08-17 NOTE — ASSESSMENT & PLAN NOTE
Clearly infected  Transferred from the [facility], Dr Deal of EP to consult  Wound culture done here at [facility]  Blood cultures collected at [facility]  Received IV daptomycin X1 at the [facility], no prior history of low GAGE for MRSA with vancomycin nor VRE  IV vancomycin ordered here  Consult ID and cards in the AM  NPO @ 12 am  Vitals are stable  Likely will full explantation, does not appear to be PPM -dependent at this time

## 2023-08-17 NOTE — CONSULTS
Cardiology Initial Consultation    Date of Service  8/17/2023    Referring Physician  Joey Red M.D.    Reason for Consultation  Left AICD pocket infection    History of Presenting Illness  Ricky Galdamez is a 53 y.o. male with a past medical history of presumptive nonischemic cardiomyopathy following biventricular AICD placement back in 2017 with subsequent generator change back in July 2023, dyslipidemia who presented 8/16/2023 with left AICD pocket infection.    He has been his usual state of health until his recent generator change back in July 2023 when he noted a very small scab with subsequent serous fluid drainage following removal of his last Steri-Strip.  He notes that the Steri-Strip fell off and there was a scab and slight probing demonstrated serous fluid drainage.  There was concern for a possible pocket hematoma and he was advised to utilize warm compresses to the area.  He has not been seeing significant improvement and notes that over time he was started to see more of a purulent discharge without a foul odor.  He presented back to the [facility] for further assessment and was ultimately transferred to [facility] for further evaluation and likely device extract with lead extraction.  Currently he notes that he has not been experiencing any fevers chills.  He does not have any pain at the site but does note a track with subsequent drainage.  He is aware that the device will need extraction.  He recalls back in 2017 after he had a cardiac arrest and subsequently had a persistently depressed LV systolic function that initiation of his biventricular pacing saw improvement in his overall ejection fraction.  Records are not currently available during this consultation for full review and comment.  Patient is a very good historian regarding his overall timelines of his device therapy.    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  He would like to be present to support this  if possible.  He voiced that as a very strong concern to be taken under advisement.    Review of Systems  Review of Systems    Past Medical History   has a past medical history of Chronic pain, Congestive heart failure (HCC), Hyperlipidemia, Hypertension, and Pacemaker.    Surgical History   has no past surgical history on file.    Family History  family history is not on file.    Social History   reports that he has never smoked. He has never used smokeless tobacco. He reports current alcohol use. He reports that he does not use drugs.    Medications  Prior to Admission Medications   Prescriptions Last Dose Informant Patient Reported? Taking?   Empagliflozin 25 MG Tab 8/16/2023 at 0530  Yes Yes   Sig: Take 12.5 mg by mouth 2 times a day.   VITAMIN D PO 8/16/2023 at 0530  Yes Yes   Sig: Take 1 Tablet by mouth every day.   atorvastatin (LIPITOR) 40 MG Tab 8/15/2023 at hs  Yes Yes   Sig: Take 40 mg by mouth every evening.   carvedilol (COREG) 12.5 MG Tab 8/16/2023 at 0530  Yes Yes   Sig: Take 12.5 mg by mouth 2 times a day with meals.   furosemide (LASIX) 20 MG Tab 8/16/2023 at 0530  Yes Yes   Sig: Take 20 mg by mouth every morning.   lisinopril (PRINIVIL) 40 MG tablet 8/16/2023 at am  Yes Yes   Sig: Take 40 mg by mouth every morning.   meloxicam (MOBIC) 15 MG tablet 8/16/2023 at 0530  Yes Yes   Sig: Take 15 mg by mouth every morning.   spironolactone (ALDACTONE) 25 MG Tab 8/16/2023 at 0530  Yes Yes   Sig: Take 25 mg by mouth every day.      Facility-Administered Medications: None       Allergies  No Known Allergies    Vital signs in last 24 hours  Temp:  [36.3 °C (97.3 °F)-37.3 °C (99.1 °F)] 36.4 °C (97.5 °F)  Pulse:  [] 86  Resp:  [18-23] 18  BP: ()/(50-75) 105/64  SpO2:  [91 %-98 %] 94 %    Physical  Exam  Physical Exam  Constitutional:       Appearance: Normal appearance. He is obese.   HENT:      Head: Normocephalic and atraumatic.      Mouth/Throat:      Mouth: Mucous membranes are moist.      Pharynx: Oropharynx is clear.   Eyes:      Extraocular Movements: Extraocular movements intact.      Conjunctiva/sclera: Conjunctivae normal.   Cardiovascular:      Rate and Rhythm: Normal rate and regular rhythm.      Pulses: Normal pulses.      Heart sounds: Normal heart sounds. No murmur heard.     No friction rub. No gallop.      Comments: Left subclavicular pocket erythema without tenderness but fistulous track with purulent material draining with slight pressure  Pulmonary:      Effort: Pulmonary effort is normal.      Breath sounds: Normal breath sounds.   Abdominal:      General: Bowel sounds are normal.      Palpations: Abdomen is soft.   Musculoskeletal:         General: Normal range of motion.      Cervical back: Normal range of motion and neck supple.   Skin:     General: Skin is warm and dry.   Neurological:      General: No focal deficit present.      Mental Status: He is alert and oriented to person, place, and time. Mental status is at baseline.   Psychiatric:         Mood and Affect: Mood normal.         Behavior: Behavior normal.         Thought Content: Thought content normal.         Judgment: Judgment normal.         Lab Review  Lab Results   Component Value Date/Time    WBC 11.3 (H) 08/17/2023 01:27 AM    RBC 5.30 08/17/2023 01:27 AM    HEMOGLOBIN 15.0 08/17/2023 01:27 AM    HEMATOCRIT 46.9 08/17/2023 01:27 AM    MCV 88.5 08/17/2023 01:27 AM    MCH 28.3 08/17/2023 01:27 AM    MCHC 32.0 (L) 08/17/2023 01:27 AM    MPV 9.3 08/17/2023 01:27 AM      Lab Results   Component Value Date/Time    SODIUM 132 (L) 08/17/2023 01:27 AM    POTASSIUM 4.0 08/17/2023 01:27 AM    CHLORIDE 99 08/17/2023 01:27 AM    CO2 22 08/17/2023 01:27 AM    GLUCOSE 108 (H) 08/17/2023 01:27 AM    BUN 28 (H) 08/17/2023 01:27 AM     "CREATININE 1.36 08/17/2023 01:27 AM      Lab Results   Component Value Date/Time    ASTSGOT 21 08/16/2023 05:02 PM    ALTSGPT 19 08/16/2023 05:02 PM     No results found for: \"CHOLSTRLTOT\", \"LDL\", \"HDL\", \"TRIGLYCERIDE\", \"TROPONINT\"    Recent Labs     08/16/23  1702   NTPROBNP <36         Assessment/Plan  1.  Implantable cardiac defibrillator/pacer pocket infection  2.  Nonischemic cardiomyopathy  3.  Dyslipidemia    Clinically, he is doing well but does have evidence of acute pacer pocket infection following generator change back in July 2023.  We will procure his records from the [facility] to further assess device type as well as overall dependency.  He does have a biventricular pacemaker/defibrillator combination.  He has not had any shocks from his defibrillator at this time.  He is being followed closely by the electrophysiology department and Dr. Deal who is planning on lead extractions as well as pocket/generator extraction.  Timing of reimplantation remains to be determined and this likely would occur on the right side of the chest wall.  The primary medicine service is engaged in antibiotic therapy and working closely with infectious disease.    For his nonischemic cardiomyopathy he remains on his home medications which consist of carvedilol 12.5 mg twice daily, spironolactone 25 mg daily, prior Jardiance 25 mg daily.    Terms of his dyslipidemia we will continue ongoing Lipitor therapy.    Please contact me with any questions.    Leonel Quiñonez D.O.   Cardiologist, [facility]  [phone]          "

## 2023-08-17 NOTE — ED NOTES
New PIV est, one set of cultures collected and sent  Pt resting comfortably on the monitor  Call light in reach  Phleb contacted for second set of cultures     Wound culture collected and sent

## 2023-08-17 NOTE — CARE PLAN
Problem: Knowledge Deficit - Standard  Goal: Patient and family/care givers will demonstrate understanding of plan of care, disease process/condition, diagnostic tests and medications  Outcome: Progressing     Problem: Pain - Standard  Goal: Alleviation of pain or a reduction in pain to the patient’s comfort goal  Outcome: Progressing  Note: Patient has been medicated per MAR.   The patient is Stable - Low risk of patient condition declining or worsening    Shift Goals  Clinical Goals: monitor pacer site, NPO midnight  Patient Goals: sleep  Family Goals: n/a    Progress made toward(s) clinical / shift goals:      Patient is not progressing towards the following goals:

## 2023-08-17 NOTE — ED NOTES
Pt is resting in bed comfortably with unlabored breathing. Currently connected to the monitor. Call light within reach. Denies any additional needs.

## 2023-08-17 NOTE — DISCHARGE PLANNING
"Case Management Discharge Planning    Admission Date: 8/16/2023  GMLOS: 2.7  ALOS: 1    6-Clicks ADL Score: 23  6-Clicks Mobility Score: 24      Anticipated Discharge Dispo: Discharge Disposition: Discharged to home/self care (01)    DME Needed: Yes    DME Ordered: Yes    Action(s) Taken: LSW met with pt to discuss assessment. (See note below).    [id number] with [employer] contacted this LSW regarding referral for pt needing Life Vest. LSW to fax needed information to [phone]. LSW faxed information.    [id number] LSW contacted the [facility] to discuss pt's authorization for wound vac and life vest. Left a message to call back.     Escalations Completed: None    Medically Clear: No    Next Steps: F/U with medical team to discuss discharge needs and barriers.    F/U with [facility] regarding DME and insurance auth.    Barriers to Discharge: Medical clearance, Pending Insurance Authorization, and DME    Care Transition Team Assessment    LMSW met with pt at bedside to complete assessment. Pt A&Ox4 and able to verify the information on the face sheet.  Pt lives alone. Prior to this hospitalization pt was independent at home with ADLs and IADLs. Pt does not use any DME at baseline; used cane in the past infrequently, no longer uses it. Pt reported that his family, friends, and coworkers are good support for him. Pt reports using \"speed\" in the past, stopped using in 2013. Pt reports having PTSD; [facility]    Information Source  Orientation Level: Oriented X4  Information Given By: Patient  Who is responsible for making decisions for patient? : Patient    Readmission Evaluation  Is this a readmission?: No    Elopement Risk  Legal Hold: No  Ambulatory or Self Mobile in Wheelchair: Yes  Disoriented: No  Psychiatric Symptoms: None  History of Wandering: No  Elopement this Admit: No  Vocalizing Wanting to Leave: No  Displays Behaviors, Body Language Wanting to Leave: No-Not at Risk for " Elopement  Elopement Risk: Not at Risk for Elopement    Interdisciplinary Discharge Planning  Primary Care Physician: Migdalia Storm  Lives with - Patient's Self Care Capacity: Alone and Able to Care For Self  Patient or legal guardian wants to designate a caregiver: No  Support Systems: Family Member(s), Friends / Neighbors    Discharge Preparedness  What is your plan after discharge?: Home with help, Home health care  What are your discharge supports?: Other (comment) (Family/Friends/Coworkers)  Prior Functional Level: Ambulatory, Independent with Activities of Daily Living, Independent with Medication Management, Drives Self    Functional Assesment  Prior Functional Level: Ambulatory, Independent with Activities of Daily Living, Independent with Medication Management, Drives Self    Finances  Financial Barriers to Discharge: No    Vision / Hearing Impairment  Vision Impairment : No  Hearing Impairment : Yes  Hearing Impairment: Both Ears, Hearing Device Not Available  Does Pt Need Special Equipment for the Hearing Impaired?: No    Domestic Abuse  Have you ever been the victim of abuse or violence?: No  Physical Abuse or Sexual Abuse: No  Verbal Abuse or Emotional Abuse: No  Possible Abuse/Neglect Reported to:: Not Applicable    Psychological Assessment  History of Substance Abuse: Amphetamines  Date Last Used - Amphetamines: 2013  History of Psychiatric Problems: Yes    Anticipated Discharge Information  Discharge Disposition: Discharged to home/self care (01)

## 2023-08-17 NOTE — ASSESSMENT & PLAN NOTE
I discussed advance care planning with the patient for at least 14 minutes, including diagnosis, prognosis, plan of care, risks and benefits of any therapies that could be offered, as well as alternatives including palliation and hospice, as appropriate. Patient is confirmed fc/fc

## 2023-08-17 NOTE — PROGRESS NOTES
4 Eyes Skin Assessment Completed by FILI Pitts and PRICILA Barraza.    Head WDL  Ears WDL  Nose WDL  Mouth WDL  Neck Redness  Breast/Chest Redness, pacer site infection  Shoulder Blades WDL  Spine WDL  (R) Arm/Elbow/Hand WDL  (L) Arm/Elbow/Hand WDL  Abdomen WDL  Groin WDL  Scrotum/Coccyx/Buttocks WDL  (R) Leg Scar  (L) Leg Scar  (R) Heel/Foot/Toe WDL  (L) Heel/Foot/Toe WDL          Devices In Places Tele Box, Blood Pressure Cuff, and Pulse Ox      Interventions In Place Pillows and Pressure Redistribution Mattress    Possible Skin Injury Yes    Pictures Uploaded Into Epic Yes  Wound Consult Placed Yes  RN Wound Prevention Protocol Ordered Yes

## 2023-08-17 NOTE — PROGRESS NOTES
"Pharmacy Vancomycin Kinetics Note for 8/16/2023     53 y.o. male on Vancomycin day # 1     Vancomycin Indication (AUC Dosing): Skin/skin structure infection    Provider specified end date: 08/21/23    Active Antibiotics (From admission, onward)      Ordered     Ordering Provider       Wed Aug 16, 2023  6:08 PM    08/16/23 1808  MD Alert...Vancomycin per Pharmacy  PHARMACY TO DOSE        Question:  Indication(s) for vancomycin?  Answer:  Skin and soft tissue infection    Jose R Ramirez M.D.       Wed Aug 16, 2023  4:55 PM    08/16/23 1655  piperacillin-tazobactam (Zosyn) 3.375 g in  mL IVPB  ONCE         Tatiana Isaacs M.D.            Dosing Weight: 100 kg (220 lb 7.4 oz)      Admission History: Admitted on 8/16/2023 for Pacemaker infection, initial encounter ([facility]) [T82.7XXA]  Pertinent history: 52YO M wth PMH of CHF and CKD III [facility]. Pacemaker was replaced on 7/6/23 and [facility] due to infected area. Patient states pacemaker has been leaking which led to today's ED visit. PTA, patient recieved Daptomycin 1000 mg x1 at 1406 (8/16). Per MD note, infective site has erythema and clear purulent yellow appearing discharge from the LUQ.    Allergies:     Patient has no known allergies.     Pertinent cultures to date:     Results       Procedure Component Value Units Date/Time    Blood Culture [[id number]] Collected: 08/16/23 1800    Order Status: Sent Specimen: Blood from Peripheral Updated: 08/16/23 1844    Narrative:      2 of 2 blood culture x2  Sites order. Per Hospital Policy:  Only change Specimen Src: to \"Line\" if specified by physician  order.    Blood Culture [[id number]] Collected: 08/16/23 1810    Order Status: Sent Specimen: Blood from Peripheral Updated: 08/16/23 1843    Narrative:      1 of 2 for Blood Culture x 2 sites order. Per Hospital  Policy: Only change Specimen Src: to \"Line\" if specified by  physician order.    Culture Wound With Gram " "Stain [[id number]] Collected: 23 1800    Order Status: Sent Specimen: Wound from Abscess Updated: 23 1827            Labs:     Estimated Creatinine Clearance: 97.2 mL/min (by C-G formula based on SCr of 1.03 mg/dL).  Recent Labs     23  1702   WBC 12.8*   NEUTSPOLYS 70.00     Recent Labs     23  1702   BUN 29*   CREATININE 1.03   ALBUMIN 3.3     No intake or output data in the 24 hours ending 23 1926   /59   Pulse (!) 104   Temp 37.3 °C (99.1 °F) (Temporal)   Resp 20   Ht 1.778 m (5' 10\")   Wt 100 kg (220 lb 14.4 oz)   SpO2 92%  Temp (24hrs), Av.3 °C (99.1 °F), Min:37.3 °C (99.1 °F), Max:37.3 °C (99.1 °F)      List concerns for Vancomycin clearance:     BUN/Scr ratio greater than 20:1;CHF;Obesity    Pharmacokinetics:     AUC kinetics:   Ke (hr ^-1): 0.0851 hr^-1  Half life: 8.15 hr  Clearance: 5.73  Estimated TDD: 2865  Estimated Dose: 1218  Estimated interval: 10.2      A/P:     -  Vancomycin dose:   Start loading dose 2500 mg x1 dose @ 1400    Begin maintenance dose 1250 mg BID @ 0200     -  Next vancomycin level(s):   Wait for steady state to order levels at third dose or sooner unless renal fx changes.     -  Predicted vancomycin AUC from initial AUC test calculator: 436 mg·hr/L    -  Comments: Patient is afebrile with elevated WBC 12.8. Patient has hx of CKD III with unknown baseline. Concern for accumulation due to renal dysfunction and obesity. Prior to transfer, patient received Daptomycin 1000 mg x1 at previous facility, this will provide adequate antimicrobial coverage for 24 hours. Plan to load patient on  and continue to monitor renal fx. MRSA PCR nares will be ordered to guide therapy.     Ayanna Azul, PharmD    "

## 2023-08-17 NOTE — ASSESSMENT & PLAN NOTE
Mild, trend, no clear symptoms, volume status appears euvolemic  If worsens, consider SIADH work up

## 2023-08-17 NOTE — FACE TO FACE
Face to Face Note  -  Durable Medical Equipment    KELLIE Ruiz - NPI: [id number]  I certify that this patient is under my care and that they have had a durable medical equipment(DME)face to face encounter by the nurse practitioner working collaboratively with me that meets the physician DME face-to-face encounter requirements with this patient on:    Date of encounter:   Patient:                    MRN:                       YOB: 2024  Ricky Galdamez  [id number]  1970     The encounter with the patient was in whole, or in part, for the following medical condition, which is the primary reason for durable medical equipment:  Wound Care and Other - Pt has CRT-D pocket infection with planned extraction of ICD 8/1/23 and will need wound vac closure of surgical site.      Pt will additionally need a life vest until new ICD can be reimplanted for history of cardiac arrest.    I certify that, based on my findings, the following durable medical equipment is medically necessary:  Wound Vac and Other DME Equipment - Life Vest .        ------------------------------------------------------------------------------------------------------------------    Face to Face Supporting Documentation - Home Health    The encounter with this patient was in whole or in part the primary reason for home health admission.    Date of encounter:   Patient:                    MRN:                       YOB: 2024  Ricky Galdamez  [id number]  1970     Home health to see patient for:  Wound Care    Skilled need for:  Comment: Wound vac care     Skilled nursing interventions to include:  Wound Care    Homebound evidenced status by:  Have a condition such that leaving his or her home is medically contraindicated. Leaving home must require a considerable and taxing effort. There must exist a normal inability to leave the home.    Community Physician to provide follow up care: No  primary care provider on file.     Optional Interventions    Wound information & treatment:    Home Infusion Therapy orders:    Line/Drain/Airway:    I certify the face to face encounter for this home care referral meets the CMS requirements and the encounter/clinical assessment with the patient was, in whole, or in part, for the medical condition(s) listed above, which is the primary reason for home health care. Based on my clinical findings: the service(s) are medically necessary, support the need for home health care, and the homebound criteria are met.  I certify that this patient has had a face to face encounter by the nurse practitioner working collaboratively with me.  JULIANA Ruiz. - NPI: [id number]    *Debility, frailty and advanced age in the absence of an acute deterioration or exacerbation of a condition do not qualify a patient for home health.

## 2023-08-17 NOTE — WOUND TEAM
[facility]  Inpatient Services  Initial Wound and Skin Care Evaluation    Admission Date: 8/16/2023     Last order of IP CONSULT TO WOUND CARE was found on 8/17/2023 from Hospital Encounter on 8/16/2023     HPI, PMH, SH: Reviewed    No past surgical history on file.  Social History     Tobacco Use    Smoking status: Never    Smokeless tobacco: Never   Substance Use Topics    Alcohol use: Yes     Comment: very rarely     Chief Complaint   Patient presents with    Sent by MD     [facility]. Pt had pacemaker replaced beginning of July and has had ongoing issues, re occurrent issues since replacement      Diagnosis: Pacemaker infection, initial encounter ([facility]) [T82.7XXA]    Unit where seen by Wound Team: T722/01     WOUND CONSULT RELATED TO:  left chest pacemaker site    WOUND TEAM PLAN OF CARE - Frequency of Follow-up:   Nursing to follow dressing orders written for wound care. Contact wound team if area fails to progress, deteriorates or with any questions/concerns if something comes up before next scheduled follow up (See below as to whether wound is following and frequency of wound follow up)   Weekly - 8/24/23    WOUND HISTORY:   'Ricky Galdamez is a 53 y.o. male with a past medical history of presumptive nonischemic cardiomyopathy following biventricular AICD placement back in 2017 with subsequent generator change back in July 2023, dyslipidemia who presented 8/16/2023 with left AICD pocket infection.     He has been his usual state of health until his recent generator change back in July 2023 when he noted a very small scab with subsequent serous fluid drainage following removal of his last Steri-Strip.  He notes that the Steri-Strip fell off and there was a scab and slight probing demonstrated serous fluid drainage.  There was concern for a possible pocket hematoma and he was advised to utilize warm compresses to the area.  He has not been seeing  significant improvement and notes that over time he was started to see more of a purulent discharge without a foul odor.  He presented back to the [facility] for further assessment and was ultimately transferred to [facility] for further evaluation and likely device extract with lead extraction.  Currently he notes that he has not been experiencing any fevers chills.  He does not have any pain at the site but does note a track with subsequent drainage.  He is aware that the device will need extraction.  He recalls back in 2017 after he had a cardiac arrest and subsequently had a persistently depressed LV systolic function that initiation of his biventricular pacing saw improvement in his overall ejection fraction.  Records are not currently available during this consultation for full review and comment.  Patient is a very good historian regarding his overall timelines of his device therapy.'       WOUND ASSESSMENT/LDA  Wound 08/16/23 Incision Chest Lateral;Upper Left (Active)   Date First Assessed/Time First Assessed: 08/16/23 2230   Present on Original Admission: Yes  Hand Hygiene Completed: Yes  Primary Wound Type: Incision  Location: Chest  Wound Orientation: Lateral;Upper  Laterality: Left      Assessments 8/17/2023 11:00 AM   Wound Image      Site Assessment Red;Yellow;Drainage;Edema;Painful;Slough   Periwound Assessment Dry;Intact;Red;Warm;Edema;Painful   Margins Defined edges   Closure Secondary intention   Drainage Amount Small   Drainage Description Purulent   Treatments Cleansed;Nonselective debridement;Site care;Offloading   Wound Cleansing Approved Wound Cleanser   Periwound Protectant No-sting Skin Prep   Dressing Status Clean;Dry;Intact   Dressing Changed Changed   Dressing Cleansing/Solutions Not Applicable   Dressing Options Hydrofiber Silver;Silicone Adhesive Foam   Dressing Change/Treatment Frequency Daily, and As Needed   NEXT Dressing Change/Treatment Date  08/18/23   NEXT Weekly Photo (Inpatient Only) 08/24/23   Wound Team Following Weekly   Non-staged Wound Description Full thickness   Wound Length (cm) 1 cm   Wound Width (cm) 0.8 cm   Wound Surface Area (cm^2) 0.8 cm^2   Wound Bed Slough (%) 100 %   Shape oval   Wound Odor None   Pulses N/A   WOUND NURSE ONLY - Time Spent with Patient (mins) 30        Vascular:    GEORGE:   No results found.    Lab Values:    Lab Results   Component Value Date/Time    WBC 11.3 (H) 08/17/2023 01:27 AM    RBC 5.30 08/17/2023 01:27 AM    HEMOGLOBIN 15.0 08/17/2023 01:27 AM    HEMATOCRIT 46.9 08/17/2023 01:27 AM    CREACTPROT 15.36 (H) 08/16/2023 05:02 PM    SEDRATEWES 16 08/16/2023 05:02 PM         Culture Results show:  No results found for this or any previous visit (from the past 720 hour(s)).    Pain Level/Medicated:  None, Tolerated without pain medication       INTERVENTIONS BY WOUND TEAM:  Chart and images reviewed. Discussed with bedside RN. All areas of concern (based on picture review, LDA review and discussion with bedside RN) have been thoroughly assessed. Documentation of areas based on significant findings. This RN in to assess patient. Performed standard wound care which includes appropriate positioning, dressing removal and non-selective debridement. Pictures and measurements obtained weekly if/when required.    Wound:  LEFT CHEST  Preparation for Dressing removal: Removed without difficulty  Cleansed/Non-selectively Debrided with:  Wound cleanser and Gauze  Michelle wound: Cleansed with Wound cleanser and Gauze, Prepped with No Sting  Primary Dressing:  aquacel ag+  Secondary (Outer) Dressing: silicone adhesive dressing    Advanced Wound Care Discharge Planning  Number of Clinicians necessary to complete wound care: 1  Is patient requiring IV pain medications for dressing changes:  No   Length of time for dressing change 30 min. (This does not include chart review, pre-medication time, set up, clean up or time spent  charting.)    Interdisciplinary consultation: Patient, Bedside RN (Emili)    EVALUATION / RATIONALE FOR TREATMENT:     Date:  08/17/23  Wound Status:  Initial evaluation    Patient with open and infected pacemaker site to left upper chest. Wound opening is covered with yellow slough, pacemaker is easily felt with gently probing. Physicians are aware and pending pacemaker removal. Aquacel Ag Hydrofiber applied to manage bioburden, absorb exudate, and maintain a moist wound environment without laterally wicking exudate therefore reducing jones-wound maceration. Covered with silicone foam dressing.          Goals: Steady decrease in wound area and depth weekly.    NURSING PLAN OF CARE ORDERS:  Dressing changes: See Dressing Care orders    NUTRITION RECOMMENDATIONS   Wound Team Recommendations:  High protein diet    DIET ORDERS (From admission to next 24h)       Start     Ordered    08/17/23 1056  Diet Order Diet: Cardiac  ALL MEALS        Question:  Diet:  Answer:  Cardiac    08/17/23 1058                    PREVENTATIVE INTERVENTIONS:    Q shift Farooq - performed per nursing policy  Q shift pressure point assessments - performed per nursing policy    Offloading/Redistribution  Float Heels off Bed with Pillows - Currently in Place         Anticipated discharge plans:  TBD        Vac Discharge Needs:  Vac Discharge plan is purely a recommendation from wound team and not a requirement for discharge unless otherwise stated by physician.  Not Applicable Pt not on a wound vac

## 2023-08-18 ENCOUNTER — APPOINTMENT (OUTPATIENT)
Dept: CARDIOLOGY | Facility: MEDICAL CENTER | Age: 53
DRG: 908 | End: 2023-08-18
Attending: NURSE PRACTITIONER
Payer: COMMERCIAL

## 2023-08-18 ENCOUNTER — ANESTHESIA EVENT (OUTPATIENT)
Dept: CARDIOLOGY | Facility: MEDICAL CENTER | Age: 53
DRG: 908 | End: 2023-08-18
Payer: COMMERCIAL

## 2023-08-18 ENCOUNTER — ANESTHESIA (OUTPATIENT)
Dept: CARDIOLOGY | Facility: MEDICAL CENTER | Age: 53
DRG: 908 | End: 2023-08-18
Payer: COMMERCIAL

## 2023-08-18 ENCOUNTER — APPOINTMENT (OUTPATIENT)
Dept: RADIOLOGY | Facility: MEDICAL CENTER | Age: 53
DRG: 908 | End: 2023-08-18
Attending: INTERNAL MEDICINE
Payer: COMMERCIAL

## 2023-08-18 LAB
ABO GROUP BLD: NORMAL
ALBUMIN SERPL BCP-MCNC: 3.2 G/DL (ref 3.2–4.9)
ALBUMIN/GLOB SERPL: 1 G/DL
ALP SERPL-CCNC: 77 U/L (ref 30–99)
ALT SERPL-CCNC: 18 U/L (ref 2–50)
ANION GAP SERPL CALC-SCNC: 9 MMOL/L (ref 7–16)
AST SERPL-CCNC: 17 U/L (ref 12–45)
BACTERIA WND AEROBE CULT: ABNORMAL
BACTERIA WND AEROBE CULT: ABNORMAL
BILIRUB SERPL-MCNC: 0.2 MG/DL (ref 0.1–1.5)
BLD GP AB SCN SERPL QL: NORMAL
BUN SERPL-MCNC: 17 MG/DL (ref 8–22)
CALCIUM ALBUM COR SERPL-MCNC: 9.4 MG/DL (ref 8.5–10.5)
CALCIUM SERPL-MCNC: 8.8 MG/DL (ref 8.5–10.5)
CHLORIDE SERPL-SCNC: 101 MMOL/L (ref 96–112)
CO2 SERPL-SCNC: 26 MMOL/L (ref 20–33)
CREAT SERPL-MCNC: 1.31 MG/DL (ref 0.5–1.4)
EKG IMPRESSION: NORMAL
EKG IMPRESSION: NORMAL
ERYTHROCYTE [DISTWIDTH] IN BLOOD BY AUTOMATED COUNT: 39.3 FL (ref 35.9–50)
GFR SERPLBLD CREATININE-BSD FMLA CKD-EPI: 65 ML/MIN/1.73 M 2
GLOBULIN SER CALC-MCNC: 3.1 G/DL (ref 1.9–3.5)
GLUCOSE SERPL-MCNC: 95 MG/DL (ref 65–99)
GRAM STN SPEC: ABNORMAL
HCT VFR BLD AUTO: 46.4 % (ref 42–52)
HGB BLD-MCNC: 15 G/DL (ref 14–18)
INR PPP: 1.02 (ref 0.87–1.13)
MCH RBC QN AUTO: 28.2 PG (ref 27–33)
MCHC RBC AUTO-ENTMCNC: 32.3 G/DL (ref 32.3–36.5)
MCV RBC AUTO: 87.4 FL (ref 81.4–97.8)
PLATELET # BLD AUTO: 208 K/UL (ref 164–446)
PMV BLD AUTO: 9.2 FL (ref 9–12.9)
POTASSIUM SERPL-SCNC: 4.1 MMOL/L (ref 3.6–5.5)
PROT SERPL-MCNC: 6.3 G/DL (ref 6–8.2)
PROTHROMBIN TIME: 13.3 SEC (ref 12–14.6)
RBC # BLD AUTO: 5.31 M/UL (ref 4.7–6.1)
RH BLD: NORMAL
SIGNIFICANT IND 70042: ABNORMAL
SITE SITE: ABNORMAL
SODIUM SERPL-SCNC: 136 MMOL/L (ref 135–145)
SOURCE SOURCE: ABNORMAL
WBC # BLD AUTO: 7.5 K/UL (ref 4.8–10.8)

## 2023-08-18 PROCEDURE — 87076 CULTURE ANAEROBE IDENT EACH: CPT

## 2023-08-18 PROCEDURE — 700111 HCHG RX REV CODE 636 W/ 250 OVERRIDE (IP): Performed by: ANESTHESIOLOGY

## 2023-08-18 PROCEDURE — A9270 NON-COVERED ITEM OR SERVICE: HCPCS

## 2023-08-18 PROCEDURE — 87070 CULTURE OTHR SPECIMN AEROBIC: CPT

## 2023-08-18 PROCEDURE — 87186 SC STD MICRODIL/AGAR DIL: CPT

## 2023-08-18 PROCEDURE — 87077 CULTURE AEROBIC IDENTIFY: CPT | Mod: 91

## 2023-08-18 PROCEDURE — 93005 ELECTROCARDIOGRAM TRACING: CPT | Performed by: INTERNAL MEDICINE

## 2023-08-18 PROCEDURE — 700111 HCHG RX REV CODE 636 W/ 250 OVERRIDE (IP): Mod: JZ | Performed by: ANESTHESIOLOGY

## 2023-08-18 PROCEDURE — 86901 BLOOD TYPING SEROLOGIC RH(D): CPT

## 2023-08-18 PROCEDURE — 85610 PROTHROMBIN TIME: CPT

## 2023-08-18 PROCEDURE — 33244 REMOVE ELCTRD TRANSVENOUSLY: CPT | Performed by: INTERNAL MEDICINE

## 2023-08-18 PROCEDURE — 86850 RBC ANTIBODY SCREEN: CPT

## 2023-08-18 PROCEDURE — 160036 HCHG PACU - EA ADDL 30 MINS PHASE I

## 2023-08-18 PROCEDURE — 160035 HCHG PACU - 1ST 60 MINS PHASE I

## 2023-08-18 PROCEDURE — 93325 DOPPLER ECHO COLOR FLOW MAPG: CPT

## 2023-08-18 PROCEDURE — 33241 REMOVE PULSE GENERATOR: CPT | Performed by: INTERNAL MEDICINE

## 2023-08-18 PROCEDURE — 85027 COMPLETE CBC AUTOMATED: CPT

## 2023-08-18 PROCEDURE — 71045 X-RAY EXAM CHEST 1 VIEW: CPT

## 2023-08-18 PROCEDURE — A9270 NON-COVERED ITEM OR SERVICE: HCPCS | Performed by: INTERNAL MEDICINE

## 2023-08-18 PROCEDURE — 36415 COLL VENOUS BLD VENIPUNCTURE: CPT

## 2023-08-18 PROCEDURE — 33234 REMOVAL OF PACEMAKER SYSTEM: CPT

## 2023-08-18 PROCEDURE — 700102 HCHG RX REV CODE 250 W/ 637 OVERRIDE(OP)

## 2023-08-18 PROCEDURE — 700105 HCHG RX REV CODE 258: Performed by: INTERNAL MEDICINE

## 2023-08-18 PROCEDURE — 700102 HCHG RX REV CODE 250 W/ 637 OVERRIDE(OP): Performed by: INTERNAL MEDICINE

## 2023-08-18 PROCEDURE — 700105 HCHG RX REV CODE 258: Performed by: ANESTHESIOLOGY

## 2023-08-18 PROCEDURE — 99233 SBSQ HOSP IP/OBS HIGH 50: CPT | Performed by: STUDENT IN AN ORGANIZED HEALTH CARE EDUCATION/TRAINING PROGRAM

## 2023-08-18 PROCEDURE — 02PA3MZ REMOVAL OF CARDIAC LEAD FROM HEART, PERCUTANEOUS APPROACH: ICD-10-PCS | Performed by: INTERNAL MEDICINE

## 2023-08-18 PROCEDURE — 0JPT0PZ REMOVAL OF CARDIAC RHYTHM RELATED DEVICE FROM TRUNK SUBCUTANEOUS TISSUE AND FASCIA, OPEN APPROACH: ICD-10-PCS | Performed by: INTERNAL MEDICINE

## 2023-08-18 PROCEDURE — 700101 HCHG RX REV CODE 250: Performed by: ANESTHESIOLOGY

## 2023-08-18 PROCEDURE — 770020 HCHG ROOM/CARE - TELE (206)

## 2023-08-18 PROCEDURE — 93010 ELECTROCARDIOGRAM REPORT: CPT | Mod: 59 | Performed by: INTERNAL MEDICINE

## 2023-08-18 PROCEDURE — 700111 HCHG RX REV CODE 636 W/ 250 OVERRIDE (IP): Performed by: INTERNAL MEDICINE

## 2023-08-18 PROCEDURE — 160002 HCHG RECOVERY MINUTES (STAT)

## 2023-08-18 PROCEDURE — 86900 BLOOD TYPING SEROLOGIC ABO: CPT

## 2023-08-18 PROCEDURE — A9270 NON-COVERED ITEM OR SERVICE: HCPCS | Performed by: ANESTHESIOLOGY

## 2023-08-18 PROCEDURE — 87205 SMEAR GRAM STAIN: CPT

## 2023-08-18 PROCEDURE — 700102 HCHG RX REV CODE 250 W/ 637 OVERRIDE(OP): Performed by: ANESTHESIOLOGY

## 2023-08-18 PROCEDURE — 80053 COMPREHEN METABOLIC PANEL: CPT

## 2023-08-18 PROCEDURE — 33234 REMOVAL OF PACEMAKER SYSTEM: CPT | Performed by: INTERNAL MEDICINE

## 2023-08-18 RX ORDER — SODIUM CHLORIDE, SODIUM LACTATE, POTASSIUM CHLORIDE, CALCIUM CHLORIDE 600; 310; 30; 20 MG/100ML; MG/100ML; MG/100ML; MG/100ML
INJECTION, SOLUTION INTRAVENOUS CONTINUOUS
Status: DISCONTINUED | OUTPATIENT
Start: 2023-08-18 | End: 2023-08-18 | Stop reason: HOSPADM

## 2023-08-18 RX ORDER — OXYCODONE HCL 5 MG/5 ML
10 SOLUTION, ORAL ORAL
Status: COMPLETED | OUTPATIENT
Start: 2023-08-18 | End: 2023-08-18

## 2023-08-18 RX ORDER — OXYCODONE HCL 5 MG/5 ML
5 SOLUTION, ORAL ORAL
Status: COMPLETED | OUTPATIENT
Start: 2023-08-18 | End: 2023-08-18

## 2023-08-18 RX ORDER — HYDROMORPHONE HYDROCHLORIDE 1 MG/ML
0.1 INJECTION, SOLUTION INTRAMUSCULAR; INTRAVENOUS; SUBCUTANEOUS
Status: DISCONTINUED | OUTPATIENT
Start: 2023-08-18 | End: 2023-08-18 | Stop reason: HOSPADM

## 2023-08-18 RX ORDER — SODIUM CHLORIDE, SODIUM LACTATE, POTASSIUM CHLORIDE, CALCIUM CHLORIDE 600; 310; 30; 20 MG/100ML; MG/100ML; MG/100ML; MG/100ML
INJECTION, SOLUTION INTRAVENOUS
Status: DISCONTINUED | OUTPATIENT
Start: 2023-08-18 | End: 2023-08-18 | Stop reason: SURG

## 2023-08-18 RX ORDER — ROCURONIUM BROMIDE 10 MG/ML
INJECTION, SOLUTION INTRAVENOUS PRN
Status: DISCONTINUED | OUTPATIENT
Start: 2023-08-18 | End: 2023-08-18 | Stop reason: SURG

## 2023-08-18 RX ORDER — HYDROMORPHONE HYDROCHLORIDE 1 MG/ML
0.4 INJECTION, SOLUTION INTRAMUSCULAR; INTRAVENOUS; SUBCUTANEOUS
Status: DISCONTINUED | OUTPATIENT
Start: 2023-08-18 | End: 2023-08-18 | Stop reason: HOSPADM

## 2023-08-18 RX ORDER — CEFAZOLIN SODIUM 1 G/3ML
INJECTION, POWDER, FOR SOLUTION INTRAMUSCULAR; INTRAVENOUS PRN
Status: DISCONTINUED | OUTPATIENT
Start: 2023-08-18 | End: 2023-08-18 | Stop reason: SURG

## 2023-08-18 RX ORDER — ACETAMINOPHEN 325 MG/1
650 TABLET ORAL EVERY 4 HOURS PRN
Status: DISCONTINUED | OUTPATIENT
Start: 2023-08-18 | End: 2023-08-20

## 2023-08-18 RX ORDER — MEPERIDINE HYDROCHLORIDE 25 MG/ML
12.5 INJECTION INTRAMUSCULAR; INTRAVENOUS; SUBCUTANEOUS
Status: DISCONTINUED | OUTPATIENT
Start: 2023-08-18 | End: 2023-08-18 | Stop reason: HOSPADM

## 2023-08-18 RX ORDER — ONDANSETRON 2 MG/ML
4 INJECTION INTRAMUSCULAR; INTRAVENOUS
Status: DISCONTINUED | OUTPATIENT
Start: 2023-08-18 | End: 2023-08-18 | Stop reason: HOSPADM

## 2023-08-18 RX ORDER — IPRATROPIUM BROMIDE AND ALBUTEROL SULFATE 2.5; .5 MG/3ML; MG/3ML
3 SOLUTION RESPIRATORY (INHALATION)
Status: DISCONTINUED | OUTPATIENT
Start: 2023-08-18 | End: 2023-08-18 | Stop reason: HOSPADM

## 2023-08-18 RX ORDER — HALOPERIDOL 5 MG/ML
1 INJECTION INTRAMUSCULAR
Status: DISCONTINUED | OUTPATIENT
Start: 2023-08-18 | End: 2023-08-18 | Stop reason: HOSPADM

## 2023-08-18 RX ORDER — ONDANSETRON 2 MG/ML
4 INJECTION INTRAMUSCULAR; INTRAVENOUS EVERY 6 HOURS PRN
Status: DISCONTINUED | OUTPATIENT
Start: 2023-08-18 | End: 2023-08-21 | Stop reason: HOSPADM

## 2023-08-18 RX ORDER — DIPHENHYDRAMINE HYDROCHLORIDE 50 MG/ML
12.5 INJECTION INTRAMUSCULAR; INTRAVENOUS
Status: DISCONTINUED | OUTPATIENT
Start: 2023-08-18 | End: 2023-08-18 | Stop reason: HOSPADM

## 2023-08-18 RX ORDER — HYDROMORPHONE HYDROCHLORIDE 2 MG/ML
INJECTION, SOLUTION INTRAMUSCULAR; INTRAVENOUS; SUBCUTANEOUS PRN
Status: DISCONTINUED | OUTPATIENT
Start: 2023-08-18 | End: 2023-08-18 | Stop reason: SURG

## 2023-08-18 RX ORDER — TRAMADOL HYDROCHLORIDE 50 MG/1
50 TABLET ORAL EVERY 6 HOURS PRN
Status: DISCONTINUED | OUTPATIENT
Start: 2023-08-18 | End: 2023-08-21 | Stop reason: HOSPADM

## 2023-08-18 RX ORDER — LIDOCAINE HYDROCHLORIDE 20 MG/ML
INJECTION, SOLUTION EPIDURAL; INFILTRATION; INTRACAUDAL; PERINEURAL PRN
Status: DISCONTINUED | OUTPATIENT
Start: 2023-08-18 | End: 2023-08-18 | Stop reason: SURG

## 2023-08-18 RX ORDER — DEXAMETHASONE SODIUM PHOSPHATE 4 MG/ML
INJECTION, SOLUTION INTRA-ARTICULAR; INTRALESIONAL; INTRAMUSCULAR; INTRAVENOUS; SOFT TISSUE PRN
Status: DISCONTINUED | OUTPATIENT
Start: 2023-08-18 | End: 2023-08-18 | Stop reason: SURG

## 2023-08-18 RX ORDER — ONDANSETRON 2 MG/ML
INJECTION INTRAMUSCULAR; INTRAVENOUS PRN
Status: DISCONTINUED | OUTPATIENT
Start: 2023-08-18 | End: 2023-08-20 | Stop reason: HOSPADM

## 2023-08-18 RX ORDER — EPHEDRINE SULFATE 50 MG/ML
5 INJECTION, SOLUTION INTRAVENOUS
Status: DISCONTINUED | OUTPATIENT
Start: 2023-08-18 | End: 2023-08-18 | Stop reason: HOSPADM

## 2023-08-18 RX ORDER — MIDAZOLAM HYDROCHLORIDE 1 MG/ML
INJECTION INTRAMUSCULAR; INTRAVENOUS PRN
Status: DISCONTINUED | OUTPATIENT
Start: 2023-08-18 | End: 2023-08-20 | Stop reason: HOSPADM

## 2023-08-18 RX ORDER — HYDROMORPHONE HYDROCHLORIDE 1 MG/ML
0.2 INJECTION, SOLUTION INTRAMUSCULAR; INTRAVENOUS; SUBCUTANEOUS
Status: DISCONTINUED | OUTPATIENT
Start: 2023-08-18 | End: 2023-08-18 | Stop reason: HOSPADM

## 2023-08-18 RX ADMIN — ACETAMINOPHEN 1000 MG: 500 TABLET, FILM COATED ORAL at 00:11

## 2023-08-18 RX ADMIN — OXYCODONE HYDROCHLORIDE 10 MG: 10 TABLET ORAL at 18:00

## 2023-08-18 RX ADMIN — SPIRONOLACTONE 25 MG: 25 TABLET ORAL at 04:54

## 2023-08-18 RX ADMIN — MIDAZOLAM 2 MG: 1 INJECTION, SOLUTION INTRAMUSCULAR; INTRAVENOUS at 13:32

## 2023-08-18 RX ADMIN — HYDROMORPHONE HYDROCHLORIDE 1 MG: 2 INJECTION INTRAMUSCULAR; INTRAVENOUS; SUBCUTANEOUS at 13:32

## 2023-08-18 RX ADMIN — CEFAZOLIN 2 G: 1 INJECTION, POWDER, FOR SOLUTION INTRAMUSCULAR; INTRAVENOUS at 13:39

## 2023-08-18 RX ADMIN — FENTANYL CITRATE 50 MCG: 50 INJECTION, SOLUTION INTRAMUSCULAR; INTRAVENOUS at 16:51

## 2023-08-18 RX ADMIN — ROCURONIUM BROMIDE 20 MG: 50 INJECTION, SOLUTION INTRAVENOUS at 14:36

## 2023-08-18 RX ADMIN — CARVEDILOL 12.5 MG: 12.5 TABLET, FILM COATED ORAL at 09:25

## 2023-08-18 RX ADMIN — FENTANYL CITRATE 50 MCG: 50 INJECTION, SOLUTION INTRAMUSCULAR; INTRAVENOUS at 15:57

## 2023-08-18 RX ADMIN — SODIUM CHLORIDE, POTASSIUM CHLORIDE, SODIUM LACTATE AND CALCIUM CHLORIDE: 600; 310; 30; 20 INJECTION, SOLUTION INTRAVENOUS at 13:26

## 2023-08-18 RX ADMIN — HYDROMORPHONE HYDROCHLORIDE 1 MG: 2 INJECTION INTRAMUSCULAR; INTRAVENOUS; SUBCUTANEOUS at 15:12

## 2023-08-18 RX ADMIN — SENNOSIDES AND DOCUSATE SODIUM 2 TABLET: 50; 8.6 TABLET ORAL at 04:54

## 2023-08-18 RX ADMIN — ACETAMINOPHEN 1000 MG: 500 TABLET, FILM COATED ORAL at 18:01

## 2023-08-18 RX ADMIN — PROPOFOL 120 MG: 10 INJECTION, EMULSION INTRAVENOUS at 13:39

## 2023-08-18 RX ADMIN — ROCURONIUM BROMIDE 50 MG: 50 INJECTION, SOLUTION INTRAVENOUS at 13:39

## 2023-08-18 RX ADMIN — ONDANSETRON 4 MG: 2 INJECTION INTRAMUSCULAR; INTRAVENOUS at 15:09

## 2023-08-18 RX ADMIN — OXYCODONE HYDROCHLORIDE 10 MG: 5 SOLUTION ORAL at 15:54

## 2023-08-18 RX ADMIN — ACETAMINOPHEN 1000 MG: 500 TABLET, FILM COATED ORAL at 04:54

## 2023-08-18 RX ADMIN — SUGAMMADEX 200 MG: 100 INJECTION, SOLUTION INTRAVENOUS at 15:09

## 2023-08-18 RX ADMIN — NOREPINEPHRINE BITARTRATE 0.1 MCG/KG/MIN: 1 INJECTION, SOLUTION, CONCENTRATE INTRAVENOUS at 13:32

## 2023-08-18 RX ADMIN — ATORVASTATIN CALCIUM 40 MG: 40 TABLET, FILM COATED ORAL at 21:58

## 2023-08-18 RX ADMIN — PROPOFOL 80 MG: 10 INJECTION, EMULSION INTRAVENOUS at 15:13

## 2023-08-18 RX ADMIN — CEFAZOLIN 2 G: 2 INJECTION, POWDER, FOR SOLUTION INTRAMUSCULAR; INTRAVENOUS at 21:59

## 2023-08-18 RX ADMIN — CEFAZOLIN 2 G: 2 INJECTION, POWDER, FOR SOLUTION INTRAMUSCULAR; INTRAVENOUS at 04:56

## 2023-08-18 RX ADMIN — LIDOCAINE HYDROCHLORIDE 80 MG: 20 INJECTION, SOLUTION EPIDURAL; INFILTRATION; INTRACAUDAL at 13:39

## 2023-08-18 RX ADMIN — DEXAMETHASONE SODIUM PHOSPHATE 8 MG: 4 INJECTION INTRA-ARTICULAR; INTRALESIONAL; INTRAMUSCULAR; INTRAVENOUS; SOFT TISSUE at 13:39

## 2023-08-18 ASSESSMENT — ENCOUNTER SYMPTOMS
FEVER: 0
CHILLS: 0
COUGH: 0
DIZZINESS: 0
HEADACHES: 0
SHORTNESS OF BREATH: 0

## 2023-08-18 ASSESSMENT — FIBROSIS 4 INDEX: FIB4 SCORE: 1.04

## 2023-08-18 NOTE — OP REPORT
"Electrophysiology Procedure Note  [facility]    Patient ID:  Name:             Ricky Galdamez   YOB: 1970  Age:                 53 y.o.  male   MRN:               [id number]    Date of procedure: 8/18/2023     Procedure(s) Performed:   1) Transvenous ICD leads removal  2) Transvenous removal of pacing lead (coronary sinus lead)  3) ICD generator removal    Indication(s):  ICD infection    Physician(s): Handy Deal M.D.     Resident/Assistant(s): None     Anesthesia: General endotracheal anesthetic with DAVID monitoring with Dr. Jane     Specimen(s) Removed: None     Estimated Blood Loss:  30cc    Complications: None.    DESCRIPTION OF PROCEDURE: After informed written consent, the patient was brought to OR19 in the fasting, unsedated state. The patient was prepped and draped in the usual sterile fashion. The procedure was performed under general anesthesia with DAVID monitoring. Right femoral venous access was obtained via modified Seldinger technique. A peel-away 6 Fr sheath was inserted over 0.035\" wire. A stiff 0.035\" wire was advanced to the SVC for potential Bridge balloon deployment. Next an incision was made over the L chest wound which was already dehiscing. The device was externalized after initial incision and pustular fluid seen. There was plenty of oozing from the tissue. The generator was removed from the pocket. We spent a good amount of time just freeing the leads from each other and adhesions into the pocket floor. Next we disconnected the leads from the generator and the generator was removed. Next we prepped the leads by cutting the proximal tip and locking the inner lumens with [employer] EZ LLDs, along with securing the insulation with 0 silk ties. We also secured the high voltage electrodes on the RV lead with 0 silk. We initially tried to apply a 14 Fr laser [employer] Glidelight sheath after traction failed to free the leads but had an \"error\" from " "the laser generator. We went ahead and used a 11 Fr Moobia Sub-C to the CS lead and after freeing the initial proximal portion the lead was removed with traction. Next we applied the 11 Fr Sub-C to the atrial lead and similarly was able to free the lead with traction after initial mechanical tool application to the subclavian portion of the lead. Next we applied the 11 Fr Sub-C to the RV lead. We were able to make progress to the SVC/innominate junction. At this time we decided to proceed trying the 14 Fr laser sheath again, which was able to be turned on without error this time. We used the glidelight to free the rest of the RV lead. Pocket was irrigated copiously. Swabs of the pocket were taken for culture. Pocket was packed with sterile packing gauze and dressed loosely. The 0.035\" wire and R femoral sheath were removed. Hemostasis with Vascade MVP closure device. Patient remained hemodynamically stable for the duration of the procedure. Following recovery from anesthesia, the patient was transferred to a monitored bed in good condition.    EXPLANTED DEVICE INFORMATION:    Pulse generator is a SJM model [id number]   Serial number [id number]      EXPLANTED LEAD INFORMATION:  1. Right atrial lead is a SJM model 2088TC/52, serial number [id number]    2. Right ventricular lead is a SJM model 7122, serial number [id number]    3. CS lead is a SJM model 1458Q/86, serial number [id number]    LASER TIME: 20 seconds    FLUOROSCOPY TIME: 6.7 minutes    SUMMARY/CONCLUSIONS:  1. Successful explant of ICD generator, ICD lead, RA pacing/sensing lead and CS pacing/sensing lead    RECOMMENDATIONS:  1. Transfer to monitored bed.  2. Wound care consult for L chest wound vac  3. Timing of re-implant to be determined    "

## 2023-08-18 NOTE — ANESTHESIA PROCEDURE NOTES
Airway    Date/Time: 8/18/2023 1:40 PM    Performed by: Junaid Jane M.D.  Authorized by: Junaid Jane M.D.    Location:  OR  Urgency:  Elective  Indications for Airway Management:  Anesthesia      Spontaneous Ventilation: absent    Sedation Level:  Deep  Preoxygenated: Yes    Patient Position:  Sniffing  Final Airway Type:  Endotracheal airway  Final Endotracheal Airway:  ETT  Cuffed: Yes    Technique Used for Successful ETT Placement:  Direct laryngoscopy    Insertion Site:  Oral  Blade Type:  Damian  Laryngoscope Blade/Videolaryngoscope Blade Size:  4  ETT Size (mm):  8.0  Measured from:  Gums  ETT to Gums (cm):  22  Placement Verified by: auscultation and capnometry    Cormack-Lehane Classification:  Grade I - full view of glottis  Number of Attempts at Approach:  1

## 2023-08-18 NOTE — PROGRESS NOTES
Assumed care of patient from day shift RN at 1850, Patient AOX4, VSS.   Fall education reinforced, call bell within reach, bed locked and in lowest position. POC discussed and all questions answered.  Purposeful and or hourly rounding in place.

## 2023-08-18 NOTE — CARE PLAN
The patient is Stable - Low risk of patient condition declining or worsening    Shift Goals  Clinical Goals: successful surgery today  Patient Goals: get surgery over with  Family Goals: n/a    Progress made toward(s) clinical / shift goals:  patient in surgery now    Patient is not progressing towards the following goals:  Problem: Knowledge Deficit - Standard  Goal: Patient and family/care givers will demonstrate understanding of plan of care, disease process/condition, diagnostic tests and medications  Outcome: Progressing     Problem: Pain - Standard  Goal: Alleviation of pain or a reduction in pain to the patient’s comfort goal  Outcome: Progressing     Problem: Skin Integrity  Goal: Skin integrity is maintained or improved  Outcome: Progressing     Problem: Fall Risk  Goal: Patient will remain free from falls  Outcome: Progressing

## 2023-08-18 NOTE — CONSULTS
DATE OF SERVICE:  08/17/2023     INFECTIOUS DISEASE CONSULTATION     Seen at the request of Joey Red MD     IDENTIFICATION:  A 53-year-old  seen for antibiotic advice for pacer   pocket infection.     HISTORY OF PRESENT ILLNESS:  This is a very nice 53-year-old male , has   a history of intraoperative cardiac arrest during gallbladder surgery in   2017.  At that time, underwent implantation of a pacemaker.  He underwent a   generator change last month on 07/06/2023 and apparently did not heal very   well and he is having some drainage.  He was treated with oral   antibiotics without benefit and [facility] increasing amount of drainage and bogginess.  The picture   [facility] the [facility] shows yellow drainage.  He is otherwise feeling well and he was then   transferred to [facility] for higher level of cardiac care where his   electrophysiologist also worked.  The patient was started on broad-spectrum   antibiotics here, but now we have culture [facility] both [facility] and [facility]   already and his antibiotics are being adjusted.  The patient continues to feel   well and seen now for advice.  He has no allergies.  He denies any   significant other medical problems except cardiac related issues.     MEDICATIONS:  At present, he was on linezolid and Zosyn and based on   culture results earlier today I switched him to IV cefazolin 2 grams q.8.     SOCIAL HISTORY:  The patient is originally from [place], more  recently lived in   [place], was in the  and now he has just moved up to [place] and he is   [facility] the [facility].  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  He is not   .  He has more family in [place].     REVIEW OF SYSTEMS:  Negative except as noted above.   /67   Pulse 79   Temp 36.8 °C (98.2 °F) (Temporal)   Resp 16   Ht 1.753 m (5'  "9\")   Wt 97.2 kg (214 lb 4.6 oz)   SpO2 91%    PHYSICAL EXAMINATION:  VITAL SIGNS:  He is a pleasant 53-year-old male, BMI of 32.  His temperature   is afebrile.  His blood pressure is 101/67.  GENERAL:  He is comfortable appearing.  He does have a new dressing over his   surgical site, which so far has not been explanted.  NECK:  Supple.  LUNGS:  Clear anteriorly.  CARDIAC:  I do not appreciate any murmur.  ABDOMEN:  Unremarkable and he has no rash.  He has a peripheral IV in.     LABORATORY RESULTS:  Include negative blood cultures so far from [facility] and here and both drainage cultures done at the [facility] and here at   [facility] are growing group C Strep.  His white count was 12.8 on admission.  His   chemistry panel was basically unremarkable.     DIAGNOSTIC DATA:  Chest x-ray, no acute cardiac or pulmonary abnormalities.     ASSESSMENT AND PLAN:  A 53-year-old  who had a recent generator change   and his pacer pocket is now infected with purulent fluid, growing group C   Streptococcus.  There is no evidence clinically or by cultures that he has   bacteremia.  Obviously, definitive treatment will be to remove the pacer and   the wires if possible and that is the plan here.  The patient will apparently   have some kind of temporary chest pacemaker while the pacemaker is out.  In   the meantime, he will continue on IV cefazolin until he is discharged from   [facility] and at which point I would like him sent home on oral amoxicillin 1000 mg   every 8 hours for 10 days.  [facility].  The   oral therapy will give him excellent blood levels equivalent to IV and avoid the   need for intravenous therapy.  I have discussed this in detail with him and he   agrees.     Thank you for allowing me to follow up the patient.        ______________________________  MD TERRI HAMMER/MARTHA/CINTHIA    DD:  08/17/2023 16:23  DT:  08/17/2023 18:19    Job#:  [id number]  "

## 2023-08-18 NOTE — ANESTHESIA TIME REPORT
Anesthesia Start and Stop Event Times     Date Time Event    8/18/2023 1253 Ready for Procedure     1326 Anesthesia Start     1540 Anesthesia Stop        Responsible Staff  08/18/23    Name Role Begin End    Junaid Jane M.D. Anesth 1326 1540        Overtime Reason:  no overtime (within assigned shift)    Comments: Arterial line with U/S and complete DAVID exam

## 2023-08-18 NOTE — ANESTHESIA PREPROCEDURE EVALUATION
" Date/Time: 08/18/23 1330    Scheduled providers: Handy Deal M.D.; Mague Marquez M.D.; Junaid Jane M.D.    Procedure: CL-ICD,PM,BIV LEAD EXTRACTION W/ ANESTHESIA    Diagnosis:       Pacemaker infection, initial encounter (HCC) [T82.7XXA]      Pacemaker infection, initial encounter (HCC) [T82.7XXA]    Indications:       See Assoicated Dx      To be scheduled by EP , Pilar WILLS    Location: [facility] Cath Lab [facility]        Past Medical History:   Diagnosis Date   • Chronic pain    • Congestive heart failure (HCC)    • Hyperlipidemia    • Hypertension    • Pacemaker      Previously reports \"coding\" under anesthesia for lap theresa. Has no further information regarding the event, however.    Current Outpatient Medications   Medication Instructions   • atorvastatin (LIPITOR) 40 mg, Oral, NIGHTLY   • carvedilol (COREG) 12.5 mg, Oral, 2 TIMES DAILY WITH MEALS   • Empagliflozin 12.5 mg, Oral, 2 TIMES DAILY   • furosemide (LASIX) 20 mg, Oral, EVERY MORNING   • lisinopril (PRINIVIL) 40 mg, Oral, EVERY MORNING   • meloxicam (MOBIC) 15 mg, Oral, EVERY MORNING   • spironolactone (ALDACTONE) 25 mg, Oral, DAILY   • VITAMIN D PO 1 Tablet, Oral, Every Day (QD)     Lab Results   Component Value Date/Time    SODIUM 136 08/18/2023 02:04 AM    POTASSIUM 4.1 08/18/2023 02:04 AM    CHLORIDE 101 08/18/2023 02:04 AM    CO2 26 08/18/2023 02:04 AM    GLUCOSE 95 08/18/2023 02:04 AM    BUN 17 08/18/2023 02:04 AM    CREATININE 1.31 08/18/2023 02:04 AM      Lab Results   Component Value Date/Time    WBC 7.5 08/18/2023 02:04 AM    RBC 5.31 08/18/2023 02:04 AM    HEMOGLOBIN 15.0 08/18/2023 02:04 AM    HEMATOCRIT 46.4 08/18/2023 02:04 AM    MCV 87.4 08/18/2023 02:04 AM    MCH 28.2 08/18/2023 02:04 AM    MCHC 32.3 08/18/2023 02:04 AM    MPV 9.2 08/18/2023 02:04 AM    NEUTSPOLYS 61.40 08/17/2023 01:27 AM    LYMPHOCYTES 22.70 08/17/2023 01:27 AM    MONOCYTES 13.70 (H) 08/17/2023 01:27 AM    EOSINOPHILS 1.40 " 08/17/2023 01:27 AM    BASOPHILS 0.40 08/17/2023 01:27 AM      TTE 8/17/23 (yesterday):  CONCLUSIONS  No prior study is available for comparison.   The left ventricular ejection fraction is visually estimated to be 60%.  No significant valvular disease.     Relevant Problems   CARDIAC   (positive) HTN (hypertension)         (positive) Chronic kidney disease (CKD), stage III (moderate) ([facility])      Other   (positive) HFrEF (heart failure with reduced ejection fraction) ([facility])   (positive) Pacemaker infection, initial encounter ([facility])       Physical Exam    Airway   Mallampati: II  TM distance: >3 FB  Neck ROM: full       Cardiovascular - normal exam  Rhythm: regular  Rate: normal  (-) murmur     Dental         Very poor dentition   Pulmonary - normal exam  Breath sounds clear to auscultation     Abdominal    Neurological - normal exam               Anesthesia Plan    ASA 3   ASA physical status 3 criteria: implanted pacemaker    Plan - general       Airway plan will be ETT  DAVID Planned  (Arterial line)      Induction: intravenous    Postoperative Plan: Postoperative administration of opioids is intended.    Pertinent diagnostic labs and testing reviewed    Informed Consent:    Anesthetic plan and risks discussed with patient.    Use of blood products discussed with: patient whom consented to blood products.       Patient fully consented for general anesthesia. Anesthetic procedure and risks discussed in detail. Risks include but are not limited to PONV, pain, sore throat, damage to teeth/lips/gums, positioning injury, allergic reaction, vocal cord injury, esophageal injury, and/or cardiopulmonary problems up to and including death. Patient indicates complete understanding. All patient questions answered to full satisfaction and they agree to proceed as planned.

## 2023-08-18 NOTE — PROGRESS NOTES
EP Attending Progress Note    Pt seen and examined. Agree with note by Dr. Clayton yesterday including physical exam and historical elements. >6 year old CRT-D s/p recent gen change with obvious pocket infection. Here for device explant. Re-implant to be timed. Risks/benefits/alternatives discussed, all questions answered.    Handy Deal MD

## 2023-08-18 NOTE — ANESTHESIA PROCEDURE NOTES
DAVID    Date/Time: 8/18/2023 1:45 PM    Performed by: Junaid Jane M.D.  Authorized by: Junaid Jane M.D.    Start Time:8/18/2023 1:45 PM  Preanesthetic Checklist: patient identified, IV checked, site marked, risks and benefits discussed, surgical consent, monitors and equipment checked, pre-op evaluation and timeout performed    Indication for DAVID: diagnostic   Patient Location: OR  Intubated: Yes  Bite Block: Yes  Heart Visualized: Yes  Insertion: atraumatic    **See FULL DAVID report in patient's chart via CV Synapse**

## 2023-08-18 NOTE — PROGRESS NOTES
Hospital Medicine Daily Progress Note    Date of Service  8/17/2023    Chief Complaint  Ricky Galdamez is a 53 y.o. male admitted 8/16/2023 with pacemaker pocket infection    Hospital Course  Ricky Galdamez is a 53 y.o. male prior intraoperative cardiac arrest and history of HFrEF status post [employer] biventricular ICD pacemaker placement in 2017 in [place] and known nonobstructive coronary disease (40% mid LAD, 40% ramus and 30% left circumflex done on September 12, 2016 (, hyperlipidemia, hypertension who presented to our hospital with presumed pacemaker infection.  [facility] was concerned for infection.  He has noted an approximately 1 cm diameter ulceration over the lateral incision site with foul-smelling yellowish purulent discharge.  He also has associated left pectoral cellulitis.  [facility] Dr. Deal who is a electrophysiologist and recommended the patient be transferred to our facility for higher level of care and likely need for explantation of pacemaker device and associated leads and cardiothoracic surgery backup.  Additionally I spoke with pharmacy here at [facility] and apparently the patient received a dose of daptomycin for unclear reasons [facility] the [facility].     Personally reviewed his outside labs at the [facility] including a CBC, procalcitonin, blood cultures, BMP.  He had a sodium of 130 with a normal kidney function.  His white blood cell count was elevated at 14 with a hemoglobin of 16.7 and platelets of 236.  His procalcitonin was 0.15.  And his blood cultures are pending.  I personally reviewed all of these labs and studies on August 16.     Here in the ER he has remained stable and has not received any additional antibiotics.  The ER physician who I spoke with his consulted cardiology who is planning to see the patient this evening.     Patient is a retired medic.  He states that he noticed a scab over the pacemaker pocket several  days after he had his pacemaker upgraded to a biventricular pacemaker and ICD combination device implanted in [facility]23 [facility].  It then changed to clear fluid drainage and then this morning became much more painful with yellowish purulent drainage.  This finally prompted him to come to the [facility] as noted above.  Patient currently feels okay with no other associated symptoms.  Cultures of the wound have been obtained here in the ER.    Interval Problem Update  8/17/2023:  Patient been evaluated by electrophysiology in addition to cardiology and infectious disease recommendations are all appreciated.  Plan is to extract pacemaker device and patient will then be given a life jacket to wear/LifeVest.  This been ordered by electrophysiology.  Patient will subsequently have wound VAC placed.  Patient be placed on appropriate antimicrobials in the form of cefazolin.  We will follow-up with infectious disease regarding duration of therapy.    I have discussed this patient's plan of care and discharge plan at IDT rounds today with Case Management, Nursing, Nursing leadership, and other members of the IDT team.    Consultants/Specialty  cardiology and infectious disease    Code Status  Full Code    Disposition  The patient is not medically cleared for discharge to home or a post-acute facility.  Anticipate discharge to: home with organized home healthcare and close outpatient follow-up    I have placed the appropriate orders for post-discharge needs.    Review of Systems  Review of Systems   Constitutional:  Positive for malaise/fatigue. Negative for chills and fever.   Respiratory:  Negative for cough and shortness of breath.    Cardiovascular:  Positive for chest pain. Negative for leg swelling.   Neurological:  Negative for dizziness and headaches.   All other systems reviewed and are negative.       Physical Exam  Temp:  [36.3 °C (97.3 °F)-37 °C (98.6 °F)] 36.5 °C (97.7 °F)  Pulse:  [] 81  Resp:  [16-23] 16  BP:  ()/(50-67) 101/67  SpO2:  [91 %-98 %] 91 %    Physical Exam  Vitals reviewed.   Constitutional:       General: He is not in acute distress.     Appearance: He is well-developed.   HENT:      Head: Normocephalic and atraumatic.      Mouth/Throat:      Pharynx: No oropharyngeal exudate.   Eyes:      General: No scleral icterus.     Pupils: Pupils are equal, round, and reactive to light.   Neck:      Thyroid: No thyromegaly.   Cardiovascular:      Rate and Rhythm: Normal rate and regular rhythm.      Heart sounds: Normal heart sounds. No murmur heard.     Comments: L pectoral ppm pocket with surrounding erythema and clear purulent yellow appearing discharge from the LUQ   Moderate TTP appreciated  Good radial pulses B/L  Pulmonary:      Effort: Pulmonary effort is normal. No respiratory distress.      Breath sounds: Normal breath sounds. No wheezing.   Chest:      Chest wall: Tenderness present.   Abdominal:      General: Bowel sounds are normal. There is no distension.      Palpations: Abdomen is soft.      Tenderness: There is no abdominal tenderness.   Musculoskeletal:         General: No tenderness. Normal range of motion.      Cervical back: Normal range of motion and neck supple.      Right lower leg: No edema.      Left lower leg: No edema.   Skin:     General: Skin is warm and dry.      Findings: No rash.   Neurological:      Mental Status: He is alert and oriented to person, place, and time.      Cranial Nerves: No cranial nerve deficit.         Fluids    Intake/Output Summary (Last 24 hours) at 8/17/2023 1719  Last data filed at 8/17/2023 1600  Gross per 24 hour   Intake 580 ml   Output 1575 ml   Net -995 ml       Laboratory  Recent Labs     08/16/23  1702 08/17/23  0127   WBC 12.8* 11.3*   RBC 5.50 5.30   HEMOGLOBIN 15.8 15.0   HEMATOCRIT 47.2 46.9   MCV 85.8 88.5   MCH 28.7 28.3   MCHC 33.5 32.0*   RDW 38.4 39.4   PLATELETCT 201 202   MPV 9.3 9.3     Recent Labs     08/16/23  1702 08/17/23 0127    SODIUM 128* 132*   POTASSIUM 4.3 4.0   CHLORIDE 97 99   CO2 17* 22   GLUCOSE 70 108*   BUN 29* 28*   CREATININE 1.03 1.36   CALCIUM 8.7 8.6                   Imaging  EC-ECHOCARDIOGRAM COMPLETE W/O CONT   Final Result      DX-CHEST-PORTABLE (1 VIEW)   Final Result      1.  No acute cardiac or pulmonary abnormalities are identified.      EC-DAVID W/O CONT    (Results Pending)   CL-ICD,PM,BIV LEAD EXTRACTION W/ ANESTHESIA    (Results Pending)        Assessment/Plan  * Pacemaker infection, initial encounter ([facility])- (present on admission)  Assessment & Plan  Clearly infected  Transferred from the [facility], Dr Deal of EP to consult  Wound culture done here at [facility]  Blood cultures collected at [facility]  Received IV daptomycin X1 at the [facility], no prior history of low GAGE for MRSA with vancomycin nor VRE  IV vancomycin ordered here  Consult ID and cards in the AM  NPO @ 12 am  Vitals are stable  Likely will full explantation, does not appear to be PPM -dependent at this time    ACP (advance care planning)- (present on admission)  Assessment & Plan  I discussed advance care planning with the patient for at least 14 minutes, including diagnosis, prognosis, plan of care, risks and benefits of any therapies that could be offered, as well as alternatives including palliation and hospice, as appropriate. Patient is confirmed fc/fc    Chronic kidney disease (CKD), stage III (moderate) ([facility])- (present on admission)  Assessment & Plan  Cr is 1.5, unclear baseline  Monitor closely, will hold ACE for now and repeat BMP tomorrow, especially given his NPO status for possible intervention    Hyponatremia- (present on admission)  Assessment & Plan  Mild, trend, no clear symptoms, volume status appears euvolemic  If worsens, consider SIADH work up    HTN (hypertension)- (present on admission)  Assessment & Plan  Decently controlled  BB, ACE, monitor    HFrEF (heart failure with reduced ejection fraction) ([facility])- (present on admission)  Assessment &  Plan  Occurred after intra-operative cardiac arrest while getting lap theresa 5 years ago  Per patient, full LVEF recovery has been achieved  Chronic at this time, no current exacerbation  Continue aldactone, toprol xl  Hold ACE and flozin  Hold lasix for now       Greater than 51 minutes spent prepping to see patient (e.g. review of tests) obtaining and/or reviewing separately obtained history. Performing a medically appropriate examination and/ evaluation.  Counseling and educating the patient/family/caregiver.  Ordering medications, tests, or procedures.  Referring and communicating with other health care professionals.  Documenting clinical information in EPIC.  Independently interpreting results and communicating results to patient/family/caregiver.  Care coordination.    Please note that this dictation was created using voice recognition software. I have made every reasonable attempt to correct obvious errors, but I expect that there are errors of grammar and possibly context that I did not discover before finalizing the note.      VTE prophylaxis:   SCDs/TEDs   pharmacologic prophylaxis contraindicated due to Pending surgical intervention      I have performed a physical exam and reviewed and updated ROS and Plan today (8/17/2023). In review of yesterday's note (8/16/2023), there are no changes except as documented above.

## 2023-08-18 NOTE — DISCHARGE PLANNING
LSW spoke to Dunia from [facility]. Per Dunia, auth is pending for life vest.    LSW spoke with Liliane with [facility] [employer]. Per Liliane, [facility]. Liliane provided contact information for [employer]. [phone].     LSW left a message with Jessie from the [facility] for [facility] to call back LSW regarding wound vac.     Dunia called this LSW stating that auth has been granted.  to call Dunia about 24 hours before pt is to be d/c to put life vest on.    [facility]isha called back this LSW regarding putting the order in for wound vac for pt.    Per afternoon rounds, pt anticipated to d/c Sunday. FELISHAW contacted [employer] regarding wound vac delivery. Per Kisha, wound vac company might not delivery on weekend, most likely will be delivered Monday.    Charge RN aware.    LSW attempted to meet pt at bedside to obtain HH choice. Pt was not in room, will attempt at a later time.

## 2023-08-18 NOTE — CARE PLAN
Problem: Knowledge Deficit - Standard  Goal: Patient and family/care givers will demonstrate understanding of plan of care, disease process/condition, diagnostic tests and medications  Outcome: Progressing     Problem: Skin Integrity  Goal: Skin integrity is maintained or improved  Outcome: Progressing   The patient is Watcher - Medium risk of patient condition declining or worsening    Shift Goals  Clinical Goals: monitor vs, pain, NPO, surgery tomorrow  Patient Goals: sleep  Family Goals: n/a    Progress made toward(s) clinical / shift goals:  Pt understands and has explained   Current POC.  Pt educated to reposition self in bed and there are new signs of skin breakdown during this writer's shift.  Patient is not progressing towards the following goals:

## 2023-08-18 NOTE — ANESTHESIA PROCEDURE NOTES
Arterial Line    Performed by: Junaid Jane M.D.  Authorized by: Junaid Jane M.D.    Start Time:  8/18/2023 1:36 PM  Localization: ultrasound guidance  Image captured, interpreted and electronically stored.  Patient Location:  OR  Indication: continuous blood pressure monitoring        Catheter Size:  20 G  Seldinger Technique?: Yes    Laterality:  Right  Site:  Radial artery  Line Secured:  Antimicrobial disc, tape and transparent dressing  Events: patient tolerated procedure well with no complications     Placed with one attempt

## 2023-08-19 LAB
ANION GAP SERPL CALC-SCNC: 8 MMOL/L (ref 7–16)
BASOPHILS # BLD AUTO: 0.2 % (ref 0–1.8)
BASOPHILS # BLD: 0.02 K/UL (ref 0–0.12)
BUN SERPL-MCNC: 14 MG/DL (ref 8–22)
CALCIUM SERPL-MCNC: 8.7 MG/DL (ref 8.5–10.5)
CHLORIDE SERPL-SCNC: 99 MMOL/L (ref 96–112)
CO2 SERPL-SCNC: 26 MMOL/L (ref 20–33)
CREAT SERPL-MCNC: 1.08 MG/DL (ref 0.5–1.4)
EOSINOPHIL # BLD AUTO: 0 K/UL (ref 0–0.51)
EOSINOPHIL NFR BLD: 0 % (ref 0–6.9)
ERYTHROCYTE [DISTWIDTH] IN BLOOD BY AUTOMATED COUNT: 38.8 FL (ref 35.9–50)
GFR SERPLBLD CREATININE-BSD FMLA CKD-EPI: 82 ML/MIN/1.73 M 2
GLUCOSE SERPL-MCNC: 164 MG/DL (ref 65–99)
GRAM STN SPEC: NORMAL
HCT VFR BLD AUTO: 48.5 % (ref 42–52)
HGB BLD-MCNC: 15.6 G/DL (ref 14–18)
IMM GRANULOCYTES # BLD AUTO: 0.04 K/UL (ref 0–0.11)
IMM GRANULOCYTES NFR BLD AUTO: 0.5 % (ref 0–0.9)
LYMPHOCYTES # BLD AUTO: 1.19 K/UL (ref 1–4.8)
LYMPHOCYTES NFR BLD: 14.3 % (ref 22–41)
MCH RBC QN AUTO: 28.6 PG (ref 27–33)
MCHC RBC AUTO-ENTMCNC: 32.2 G/DL (ref 32.3–36.5)
MCV RBC AUTO: 88.8 FL (ref 81.4–97.8)
MONOCYTES # BLD AUTO: 0.3 K/UL (ref 0–0.85)
MONOCYTES NFR BLD AUTO: 3.6 % (ref 0–13.4)
NEUTROPHILS # BLD AUTO: 6.8 K/UL (ref 1.82–7.42)
NEUTROPHILS NFR BLD: 81.4 % (ref 44–72)
NRBC # BLD AUTO: 0 K/UL
NRBC BLD-RTO: 0 /100 WBC (ref 0–0.2)
PLATELET # BLD AUTO: 236 K/UL (ref 164–446)
PMV BLD AUTO: 9.1 FL (ref 9–12.9)
POTASSIUM SERPL-SCNC: 4.6 MMOL/L (ref 3.6–5.5)
RBC # BLD AUTO: 5.46 M/UL (ref 4.7–6.1)
SIGNIFICANT IND 70042: NORMAL
SITE SITE: NORMAL
SODIUM SERPL-SCNC: 133 MMOL/L (ref 135–145)
SOURCE SOURCE: NORMAL
WBC # BLD AUTO: 8.4 K/UL (ref 4.8–10.8)

## 2023-08-19 PROCEDURE — 80048 BASIC METABOLIC PNL TOTAL CA: CPT

## 2023-08-19 PROCEDURE — 700105 HCHG RX REV CODE 258: Performed by: INTERNAL MEDICINE

## 2023-08-19 PROCEDURE — 302098 PASTE RING (FLAT): Performed by: STUDENT IN AN ORGANIZED HEALTH CARE EDUCATION/TRAINING PROGRAM

## 2023-08-19 PROCEDURE — 700102 HCHG RX REV CODE 250 W/ 637 OVERRIDE(OP)

## 2023-08-19 PROCEDURE — 700102 HCHG RX REV CODE 250 W/ 637 OVERRIDE(OP): Performed by: INTERNAL MEDICINE

## 2023-08-19 PROCEDURE — 700111 HCHG RX REV CODE 636 W/ 250 OVERRIDE (IP): Performed by: INTERNAL MEDICINE

## 2023-08-19 PROCEDURE — 85025 COMPLETE CBC W/AUTO DIFF WBC: CPT

## 2023-08-19 PROCEDURE — A9270 NON-COVERED ITEM OR SERVICE: HCPCS | Performed by: INTERNAL MEDICINE

## 2023-08-19 PROCEDURE — 99233 SBSQ HOSP IP/OBS HIGH 50: CPT | Performed by: STUDENT IN AN ORGANIZED HEALTH CARE EDUCATION/TRAINING PROGRAM

## 2023-08-19 PROCEDURE — 770020 HCHG ROOM/CARE - TELE (206)

## 2023-08-19 PROCEDURE — 97605 NEG PRS WND THER DME<=50SQCM: CPT

## 2023-08-19 PROCEDURE — A9270 NON-COVERED ITEM OR SERVICE: HCPCS

## 2023-08-19 PROCEDURE — 36415 COLL VENOUS BLD VENIPUNCTURE: CPT

## 2023-08-19 RX ADMIN — OXYCODONE HYDROCHLORIDE 10 MG: 10 TABLET ORAL at 19:51

## 2023-08-19 RX ADMIN — ACETAMINOPHEN 1000 MG: 500 TABLET, FILM COATED ORAL at 13:14

## 2023-08-19 RX ADMIN — CARVEDILOL 12.5 MG: 12.5 TABLET, FILM COATED ORAL at 08:30

## 2023-08-19 RX ADMIN — SENNOSIDES AND DOCUSATE SODIUM 2 TABLET: 50; 8.6 TABLET ORAL at 05:47

## 2023-08-19 RX ADMIN — ACETAMINOPHEN 1000 MG: 500 TABLET, FILM COATED ORAL at 01:37

## 2023-08-19 RX ADMIN — ACETAMINOPHEN 1000 MG: 500 TABLET, FILM COATED ORAL at 05:47

## 2023-08-19 RX ADMIN — SPIRONOLACTONE 25 MG: 25 TABLET ORAL at 05:47

## 2023-08-19 RX ADMIN — OXYCODONE HYDROCHLORIDE 10 MG: 10 TABLET ORAL at 13:14

## 2023-08-19 RX ADMIN — CEFAZOLIN 2 G: 2 INJECTION, POWDER, FOR SOLUTION INTRAMUSCULAR; INTRAVENOUS at 05:49

## 2023-08-19 RX ADMIN — ATORVASTATIN CALCIUM 40 MG: 40 TABLET, FILM COATED ORAL at 21:15

## 2023-08-19 RX ADMIN — ACETAMINOPHEN 1000 MG: 500 TABLET, FILM COATED ORAL at 16:18

## 2023-08-19 RX ADMIN — CARVEDILOL 12.5 MG: 12.5 TABLET, FILM COATED ORAL at 16:18

## 2023-08-19 RX ADMIN — OXYCODONE HYDROCHLORIDE 10 MG: 10 TABLET ORAL at 10:14

## 2023-08-19 RX ADMIN — CEFAZOLIN 2 G: 2 INJECTION, POWDER, FOR SOLUTION INTRAMUSCULAR; INTRAVENOUS at 13:17

## 2023-08-19 RX ADMIN — CEFAZOLIN 2 G: 2 INJECTION, POWDER, FOR SOLUTION INTRAMUSCULAR; INTRAVENOUS at 21:20

## 2023-08-19 RX ADMIN — OXYCODONE HYDROCHLORIDE 10 MG: 10 TABLET ORAL at 16:47

## 2023-08-19 ASSESSMENT — ENCOUNTER SYMPTOMS
COUGH: 0
CHILLS: 0
FEVER: 0
DIZZINESS: 0
SHORTNESS OF BREATH: 0
HEADACHES: 0

## 2023-08-19 ASSESSMENT — PATIENT HEALTH QUESTIONNAIRE - PHQ9
2. FEELING DOWN, DEPRESSED, IRRITABLE, OR HOPELESS: NOT AT ALL
1. LITTLE INTEREST OR PLEASURE IN DOING THINGS: NOT AT ALL
SUM OF ALL RESPONSES TO PHQ9 QUESTIONS 1 AND 2: 0

## 2023-08-19 ASSESSMENT — COGNITIVE AND FUNCTIONAL STATUS - GENERAL
DRESSING REGULAR UPPER BODY CLOTHING: A LITTLE
MOBILITY SCORE: 24
SUGGESTED CMS G CODE MODIFIER DAILY ACTIVITY: CI
SUGGESTED CMS G CODE MODIFIER MOBILITY: CH
DAILY ACTIVITIY SCORE: 23

## 2023-08-19 ASSESSMENT — FIBROSIS 4 INDEX: FIB4 SCORE: 0.92

## 2023-08-19 ASSESSMENT — PAIN DESCRIPTION - PAIN TYPE
TYPE: ACUTE PAIN
TYPE: ACUTE PAIN

## 2023-08-19 NOTE — DISCHARGE PLANNING
Case Management Discharge Planning    Admission Date: 8/16/2023  GMLOS: 2.7  ALOS: 3    6-Clicks ADL Score: 23  6-Clicks Mobility Score: 24      Anticipated Discharge Dispo: Discharge Disposition: D/T to home under HHA care in anticipation of covered skilled care (06)    DME Needed: No    Action(s) Taken: Patient Conference, Choice obtained, and Referral(s) sent    Escalations Completed: None    Medically Clear: No    Barriers to Discharge: Medical clearance    Wound vac was placed on the patient, life vest to be applied on Monday. Choice obtained for , choice form faxed to [facility] and referrals sent. He as accepted by [facility], they will submit for insurance auth today, [facility] staff will not review until Monday. The plan is for patient to discharge home Monday, however it is unlikely that auth will be received on Monday patient was notified of this. CM will follow up

## 2023-08-19 NOTE — OR NURSING
1537 Pt out from OR. Pt awakens to voice, denies pain or nausea. VSS. Left chest site is clean and dry, and no signs of bleeding. Right groin site with gauze and tegaderm is clean, dry and soft. Bedrest for 4 hours until 1920.  1545 Tolerating orals  1611 EKG at bedside  1616 Called Ammon, pt's spouse and updated her on POC  1628 XRay at bedside  1725 Report to Ritu PENN  1730 Pt transported to T722 by RNs

## 2023-08-19 NOTE — PROGRESS NOTES
Received bedside report from RN, pt care assumed, VSS, pt assessment complete. Pt AAOx4, tele monitor on. No signs of acute distress noted at this time. Plan of care discussed with pt and verbalizes no questions. Pt denies any additional needs at this time. Bed locked/in lowest position, pt educated on fall risk and verbalized understanding, call light within reach, hourly rounding initiated.

## 2023-08-19 NOTE — WOUND TEAM
[facility]  Inpatient Services  Wound and Skin Care Follow-up    Admission Date: 8/16/2023     Last order of IP CONSULT TO WOUND CARE was found on 8/18/2023 from Hospital Encounter on 8/16/2023     HPI, PMH, SH: Reviewed    No past surgical history on file.  Social History     Tobacco Use    Smoking status: Never    Smokeless tobacco: Never   Substance Use Topics    Alcohol use: Yes     Comment: very rarely     Chief Complaint   Patient presents with    Sent by MD     [facility]. Pt had pacemaker replaced beginning of July and has had ongoing issues, re occurrent issues since replacement      Diagnosis: Pacemaker infection, initial encounter ([facility]) [T82.7XXA]    Unit where seen by Wound Team: T722/01     WOUND FOLLOW UP RELATED TO:  left chest pacemaker excision site       WOUND TEAM PLAN OF CARE - Frequency of Follow-up:   Nursing to follow dressing orders written for wound care. Contact wound team if area fails to progress, deteriorates or with any questions/concerns if something comes up before next scheduled follow up (See below as to whether wound is following and frequency of wound follow up)  Dressing changes by wound team:                   3 times weekly - [facility]    WOUND HISTORY:       'Ricky Galdamez is a 53 y.o. male with a past medical history of presumptive nonischemic cardiomyopathy following biventricular AICD placement back in 2017 with subsequent generator change back in July 2023, dyslipidemia who presented 8/16/2023 with left AICD pocket infection.     He has been his usual state of health until his recent generator change back in July 2023 when he noted a very small scab with subsequent serous fluid drainage following removal of his last Steri-Strip.  He notes that the Steri-Strip fell off and there was a scab and slight probing demonstrated serous fluid drainage.  There was concern for a possible pocket hematoma and he was advised  to utilize warm compresses to the area.  He has not been seeing significant improvement and notes that over time he was started to see more of a purulent discharge without a foul odor.  He presented back to the [facility] for further assessment and was ultimately transferred to [facility] for further evaluation and likely device extract with lead extraction.  Currently he notes that he has not been experiencing any fevers chills.  He does not have any pain at the site but does note a track with subsequent drainage.  He is aware that the device will need extraction.  He recalls back in 2017 after he had a cardiac arrest and subsequently had a persistently depressed LV systolic function that initiation of his biventricular pacing saw improvement in his overall ejection fraction.  Records are not currently available during this consultation for full review and comment.  Patient is a very good historian regarding his overall timelines of his device therapy.'       WOUND ASSESSMENT/LDA  Wound 08/16/23 Incision Chest Lateral;Upper Left (Active)   Date First Assessed/Time First Assessed: 08/16/23 2230   Present on Original Admission: Yes  Hand Hygiene Completed: Yes  Primary Wound Type: Incision  Location: Chest  Wound Orientation: Lateral;Upper  Laterality: Left      Assessments 8/19/2023 12:00 PM   Wound Image     Site Assessment Red;Yellow;Drainage;Granulation tissue;Slough;Painful;Undermining   Periwound Assessment Pink;Intact;Edema   Margins Defined edges;Unattached edges   Closure Secondary intention   Drainage Amount Small   Drainage Description Serosanguineous   Treatments Cleansed;Nonselective debridement;Site care;Offloading   Offloading/DME Other (comment)   Wound Cleansing Approved Wound Cleanser   Periwound Protectant No-sting Skin Prep;Paste Ring;Drape   Dressing Status Removed   Dressing Changed Changed   Dressing Cleansing/Solutions Normal Saline   Dressing Options Wound  Vac;Vac Drape   Dressing Change/Treatment Frequency Monday, Wednesday, Friday, and As Needed   NEXT Dressing Change/Treatment Date 08/21/23   NEXT Weekly Photo (Inpatient Only) 08/21/23   Wound Team Following 3x Weekly   Non-staged Wound Description Full thickness   Wound Length (cm) 2 cm   Wound Width (cm) 6.1 cm   Wound Depth (cm) 2.2 cm   Wound Surface Area (cm^2) 12.2 cm^2   Wound Volume (cm^3) 26.84 cm^3   Wound Bed Granulation (%) 90 %   Wound Bed Slough (%) 10 %   Undermining (cm) 4.5 cm   Undermining of Wound, 1st Location From 12 o'clock;To 12 o'clock   Shape eliptical   Wound Odor None   Exposed Structures Adipose;Connective tissue   WOUND NURSE ONLY - Time Spent with Patient (mins) 30        Vascular:    GEORGE:   No results found.    Lab Values:    Lab Results   Component Value Date/Time    WBC 8.4 08/19/2023 01:54 AM    RBC 5.46 08/19/2023 01:54 AM    HEMOGLOBIN 15.6 08/19/2023 01:54 AM    HEMATOCRIT 48.5 08/19/2023 01:54 AM    CREACTPROT 15.36 (H) 08/16/2023 05:02 PM    SEDRATEWES 16 08/16/2023 05:02 PM         Culture Results show:  Recent Results (from the past 720 hour(s))   Culture Wound With Gram Stain    Collection Time: 08/16/23  6:00 PM    Specimen: Abscess; Wound   Result Value Ref Range    Significant Indicator POS (POS)     Source WND     Site Left Chest     Culture Result Rare growth usual skin yarelis. (A)     Gram Stain Result Few WBCs.  No organisms seen.       Culture Result Beta Streptococcus Group C  Moderate growth   (A)        Pain Level/Medicated:  PO pain medications administered by bedside RN 30 minutes prior       INTERVENTIONS BY WOUND TEAM:  Chart and images reviewed. Discussed with bedside RN. All areas of concern (based on picture review, LDA review and discussion with bedside RN) have been thoroughly assessed. Documentation of areas based on significant findings. This RN in to assess patient. Performed standard wound care which includes appropriate positioning, dressing removal  and non-selective debridement. Pictures and measurements obtained weekly if/when required.    Wound:  LEFT CHEST  Preparation for Dressing removal: Dressing soaked with wound cleanser  Cleansed/Non-selectively Debrided with:  Wound cleanser and Gauze  Michelle wound: Cleansed with Wound cleanser and Gauze, Prepped with No Sting, Paste Rings, and Drape  Primary Dressing:  one piece black foam to wound bed and for button  Secondary (Outer) Dressing: trac pad and drape to seal    Advanced Wound Care Discharge Planning  Number of Clinicians necessary to complete wound care: 1  Is patient requiring IV pain medications for dressing changes:  No   Length of time for dressing change 30 min. (This does not include chart review, pre-medication time, set up, clean up or time spent charting.)    Interdisciplinary consultation: Patient, Bedside RN (Edinson), Provider MD Red and Case Management Reji Gimenez .     EVALUATION / RATIONALE FOR TREATMENT:     Date:  08/19/23  Wound Status:  Initial evaluation after pacemaker removal    Wound bed is majority red tissue with scant yellow tissue visible. Serosanguinous drainage. Michelle-wound is pink and intact, tender to the touch. NPWT with VF w/normal saline applied to assist with wound closure by secondary intention, management of bio-burden and exudate through mechanical debridement, and increase oxygenation and granulation tissue production to wound bed. Spoke to Dr Red regarding wound status. Contacted Reji Gimenez, via voalte, regarding case management arranging for home vac and home health.      Date:  08/17/23  Wound Status:  Initial evaluation    Patient with open and infected pacemaker site to left upper chest. Wound opening is covered with yellow slough, pacemaker is easily felt with gently probing. Physicians are aware and pending pacemaker removal. Aquacel Ag Hydrofiber applied to manage bioburden, absorb exudate, and maintain a moist wound environment without  laterally wicking exudate therefore reducing jones-wound maceration. Covered with silicone foam dressing.          Goals: Steady decrease in wound area and depth weekly.    NURSING PLAN OF CARE ORDERS:  Dressing changes: See Dressing Care orders  Skin care: See Skin Care orders    NUTRITION RECOMMENDATIONS   Wound Team Recommendations:  High protein diet     DIET ORDERS (From admission to next 24h)       Start     Ordered    08/18/23 1757  Diet Order Diet: Cardiac  ALL MEALS        Question:  Diet:  Answer:  Cardiac    08/18/23 1756                    PREVENTATIVE INTERVENTIONS:   Q shift Farooq - performed per nursing policy  Q shift pressure point assessments - performed per nursing policy    Offloading/Redistribution  Float Heels off Bed with Pillows - Currently in Place         Anticipated discharge plans:  Home Health Care and wound vac for discharge home         Vac Discharge Needs:  Vac Discharge plan is purely a recommendation from wound team and not a requirement for discharge unless otherwise stated by physician.  Veraflo Vac while inpatient, ok to transition to Regular Vac on discharge

## 2023-08-19 NOTE — PROGRESS NOTES
Hospital Medicine Daily Progress Note    Date of Service  8/18/2023    Chief Complaint  Ricky Galdamez is a 53 y.o. male admitted 8/16/2023 with pacemaker pocket infection    Hospital Course  Ricky Galdamez is a 53 y.o. male prior intraoperative cardiac arrest and history of HFrEF status post [employer] biventricular ICD pacemaker placement in 2017 in [place] and known nonobstructive coronary disease (40% mid LAD, 40% ramus and 30% left circumflex done on September 12, 2016 (, hyperlipidemia, hypertension who presented to our hospital with presumed pacemaker infection.  [facility] he was concerned for infection.  He has noted an approximately 1 cm diameter ulceration over the lateral incision site with foul-smelling yellowish purulent discharge.  He also has associated left pectoral cellulitis.  [facility] Dr. Deal who is a electrophysiologist and recommended the patient be transferred to our facility for higher level of care and likely need for explantation of pacemaker device and associated leads and cardiothoracic surgery backup.  Additionally I spoke with pharmacy here at [facility] and apparently the patient received a dose of daptomycin for unclear reasons [facility] the [facility].     Personally reviewed his outside labs at the [facility] including a CBC, procalcitonin, blood cultures, BMP.  He had a sodium of 130 with a normal kidney function.  His white blood cell count was elevated at 14 with a hemoglobin of 16.7 and platelets of 236.  His procalcitonin was 0.15.  And his blood cultures are pending.  I personally reviewed all of these labs and studies on August 16.     Here in the ER he has remained stable and has not received any additional antibiotics.  The ER physician who I spoke with his consulted cardiology who is planning to see the patient this evening.     Patient is a retired medic.  He states that he noticed a scab over the pacemaker pocket several  days after he had his pacemaker upgraded to a biventricular pacemaker and ICD combination device [facility].  It then changed to clear fluid drainage and then this morning became much more painful with yellowish purulent drainage.  This finally prompted [facility] as noted [facility].  Patient currently feels okay with no other associated symptoms.  Cultures of the wound have been obtained here in the ER.    Interval Problem Update  8/17/2023:  Patient been evaluated by electrophysiology in addition to cardiology and infectious disease recommendations are all appreciated.  Plan is to extract pacemaker device and patient will then be given a life jacket to wear/LifeVest.  This been ordered by electrophysiology.  Patient will subsequently have wound VAC placed.  Patient be placed on appropriate antimicrobials in the form of cefazolin.  We will follow-up with infectious disease regarding duration of therapy.  8/18/2023:  Patient underwent successful pacemaker device explantation with leads today with electrophysiology in OR and tolerated procedure well without issue.  Patient had appropriate wound dressings placed pocket was packed.  Patient will be closely followed by wound care team.  Continue present antimicrobial therapy infectious disease recommendations appreciated.  We will follow-up on culture results that were sent to lab today.  Otherwise continue to monitor on telemetry over the course the evening.  Patient is to have LifeVest placed for emergency defibrillation needs.  This is being facility by case management anticipate this will be placed and available on 8/19/2023.  Patient may require outpatient antibiotic infusions.  We will further coordinate this with infectious disease.  Otherwise continue present medical management.  Repeat labs in the morning.  I have discussed this patient's plan of care and discharge plan at IDT rounds today with Case Management, Nursing, Nursing  leadership, and other members of the IDT team.    Consultants/Specialty  cardiology and infectious disease    Code Status  Full Code    Disposition  The patient is not medically cleared for discharge to home or a post-acute facility.  Anticipate discharge to: home with organized home healthcare and close outpatient follow-up    I have placed the appropriate orders for post-discharge needs.    Review of Systems  Review of Systems   Constitutional:  Positive for malaise/fatigue. Negative for chills and fever.   Respiratory:  Negative for cough and shortness of breath.    Cardiovascular:  Positive for chest pain. Negative for leg swelling.   Neurological:  Negative for dizziness and headaches.   All other systems reviewed and are negative.       Physical Exam  Temp:  [35.7 °C (96.3 °F)-36.8 °C (98.2 °F)] 36.1 °C (96.9 °F)  Pulse:  [68-92] 90  Resp:  [9-20] 20  BP: (100-125)/(59-70) 106/59  SpO2:  [91 %-99 %] 96 %    Physical Exam  Vitals reviewed.   Constitutional:       General: He is not in acute distress.     Appearance: He is well-developed.   HENT:      Head: Normocephalic and atraumatic.      Mouth/Throat:      Pharynx: No oropharyngeal exudate.   Eyes:      General: No scleral icterus.     Pupils: Pupils are equal, round, and reactive to light.   Neck:      Thyroid: No thyromegaly.   Cardiovascular:      Rate and Rhythm: Normal rate and regular rhythm.      Heart sounds: Normal heart sounds. No murmur heard.     Comments: L pectoral ppm pocket with surrounding erythema and clear purulent yellow appearing discharge from the LUQ   Moderate TTP appreciated  Good radial pulses B/L  Pulmonary:      Effort: Pulmonary effort is normal. No respiratory distress.      Breath sounds: Normal breath sounds. No wheezing.   Chest:      Chest wall: Tenderness present.   Abdominal:      General: Bowel sounds are normal. There is no distension.      Palpations: Abdomen is soft.      Tenderness: There is no abdominal tenderness.    Musculoskeletal:         General: No tenderness. Normal range of motion.      Cervical back: Normal range of motion and neck supple.      Right lower leg: No edema.      Left lower leg: No edema.   Skin:     General: Skin is warm and dry.      Findings: No rash.   Neurological:      Mental Status: He is alert and oriented to person, place, and time.      Cranial Nerves: No cranial nerve deficit.         Fluids    Intake/Output Summary (Last 24 hours) at 8/18/2023 1718  Last data filed at 8/18/2023 1540  Gross per 24 hour   Intake 600 ml   Output 320 ml   Net 280 ml         Laboratory  Recent Labs     08/16/23  1702 08/17/23  0127 08/18/23  0204   WBC 12.8* 11.3* 7.5   RBC 5.50 5.30 5.31   HEMOGLOBIN 15.8 15.0 15.0   HEMATOCRIT 47.2 46.9 46.4   MCV 85.8 88.5 87.4   MCH 28.7 28.3 28.2   MCHC 33.5 32.0* 32.3   RDW 38.4 39.4 39.3   PLATELETCT 201 202 208   MPV 9.3 9.3 9.2       Recent Labs     08/16/23  1702 08/17/23  0127 08/18/23  0204   SODIUM 128* 132* 136   POTASSIUM 4.3 4.0 4.1   CHLORIDE 97 99 101   CO2 17* 22 26   GLUCOSE 70 108* 95   BUN 29* 28* 17   CREATININE 1.03 1.36 1.31   CALCIUM 8.7 8.6 8.8       Recent Labs     08/18/23  0204   INR 1.02               Imaging  EC-DAVID W/O CONT         DX-CHEST-PORTABLE (1 VIEW)   Final Result      No acute cardiac or pulmonary abnormalities are identified following removal of the LEFT subclavian pacing device.      EC-ECHOCARDIOGRAM COMPLETE W/O CONT   Final Result      DX-CHEST-PORTABLE (1 VIEW)   Final Result      1.  No acute cardiac or pulmonary abnormalities are identified.      CL-ICD,PM,BIV LEAD EXTRACTION W/ ANESTHESIA    (Results Pending)          Assessment/Plan  * Pacemaker infection, initial encounter ([facility])- (present on admission)  Assessment & Plan  Clearly infected  Transferred from the [facility], Dr Deal of EP to consult  Wound culture done here at [facility]  Blood cultures collected at [facility]  Received IV daptomycin X1 at the [facility], no prior history of low GAGE for  MRSA with vancomycin nor VRE  IV vancomycin ordered here  Consult ID and cards in the AM  NPO @ 12 am  Vitals are stable  Likely will full explantation, does not appear to be PPM -dependent at this time    ACP (advance care planning)- (present on admission)  Assessment & Plan  I discussed advance care planning with the patient for at least 14 minutes, including diagnosis, prognosis, plan of care, risks and benefits of any therapies that could be offered, as well as alternatives including palliation and hospice, as appropriate. Patient is confirmed fc/fc    Chronic kidney disease (CKD), stage III (moderate) (HCC)- (present on admission)  Assessment & Plan  Cr is 1.5, unclear baseline  Monitor closely, will hold ACE for now and repeat BMP tomorrow, especially given his NPO status for possible intervention    Hyponatremia- (present on admission)  Assessment & Plan  Mild, trend, no clear symptoms, volume status appears euvolemic  If worsens, consider SIADH work up    HTN (hypertension)- (present on admission)  Assessment & Plan  Decently controlled  BB, ACE, monitor    HFrEF (heart failure with reduced ejection fraction) ([facility])- (present on admission)  Assessment & Plan  Occurred after intra-operative cardiac arrest while getting lap theresa 5 years ago  Per patient, full LVEF recovery has been achieved  Chronic at this time, no current exacerbation  Continue aldactone, toprol xl  Hold ACE and flozin  Hold lasix for now       Greater than 51 minutes spent prepping to see patient (e.g. review of tests) obtaining and/or reviewing separately obtained history. Performing a medically appropriate examination and/ evaluation.  Counseling and educating the patient/family/caregiver.  Ordering medications, tests, or procedures.  Referring and communicating with other health care professionals.  Documenting clinical information in EPIC.  Independently interpreting results and communicating results to patient/family/caregiver.  Care  coordination.    Please note that this dictation was created using voice recognition software. I have made every reasonable attempt to correct obvious errors, but I expect that there are errors of grammar and possibly context that I did not discover before finalizing the note.      VTE prophylaxis:   SCDs/TEDs   pharmacologic prophylaxis contraindicated due to Perioperative procedure      I have performed a physical exam and reviewed and updated ROS and Plan today (8/18/2023). In review of yesterday's note (8/17/2023), there are no changes except as documented above.

## 2023-08-19 NOTE — CARE PLAN
The patient is Stable - Low risk of patient condition declining or worsening    Shift Goals  Clinical Goals: Monitor L.chest site, labs, vitals, R.groin/arterial line site  Patient Goals: updates  Family Goals: no family present    Progress made toward(s) clinical / shift goals:      Problem: Knowledge Deficit - Standard  Goal: Patient and family/care givers will demonstrate understanding of plan of care, disease process/condition, diagnostic tests and medications  Outcome: Progressing     Problem: Pain - Standard  Goal: Alleviation of pain or a reduction in pain to the patient’s comfort goal  Outcome: Progressing     Problem: Skin Integrity  Goal: Skin integrity is maintained or improved  Outcome: Progressing     Problem: Fall Risk  Goal: Patient will remain free from falls  Outcome: Progressing       Patient is not progressing towards the following goals:

## 2023-08-19 NOTE — CARE PLAN
Problem: Knowledge Deficit - Standard  Goal: Patient and family/care givers will demonstrate understanding of plan of care, disease process/condition, diagnostic tests and medications  8/19/2023 1059 by Edinson Wong R.N.  Outcome: Progressing  8/19/2023 1059 by Edinson Wong R.N.  Outcome: Progressing     Problem: Pain - Standard  Goal: Alleviation of pain or a reduction in pain to the patient’s comfort goal  8/19/2023 1059 by Edinson Wong R.N.  Outcome: Progressing  8/19/2023 1059 by Edinson Wong R.N.  Outcome: Progressing     Problem: Skin Integrity  Goal: Skin integrity is maintained or improved  8/19/2023 1059 by Edinson Wong R.N.  Outcome: Progressing  8/19/2023 1059 by Edinson Wong R.N.  Outcome: Progressing     Problem: Fall Risk  Goal: Patient will remain free from falls  Outcome: Progressing   The patient is Watcher - Medium risk of patient condition declining or worsening    Shift Goals  Clinical Goals: Monitor L.chest site, labs, vitals, R.groin/arterial line site  Patient Goals: updates  Family Goals: no family present

## 2023-08-20 LAB
ANION GAP SERPL CALC-SCNC: 7 MMOL/L (ref 7–16)
BASOPHILS # BLD AUTO: 0.8 % (ref 0–1.8)
BASOPHILS # BLD: 0.07 K/UL (ref 0–0.12)
BUN SERPL-MCNC: 12 MG/DL (ref 8–22)
CALCIUM SERPL-MCNC: 8.2 MG/DL (ref 8.5–10.5)
CHLORIDE SERPL-SCNC: 101 MMOL/L (ref 96–112)
CO2 SERPL-SCNC: 27 MMOL/L (ref 20–33)
CREAT SERPL-MCNC: 1.14 MG/DL (ref 0.5–1.4)
EOSINOPHIL # BLD AUTO: 0.1 K/UL (ref 0–0.51)
EOSINOPHIL NFR BLD: 1.2 % (ref 0–6.9)
ERYTHROCYTE [DISTWIDTH] IN BLOOD BY AUTOMATED COUNT: 38.2 FL (ref 35.9–50)
GFR SERPLBLD CREATININE-BSD FMLA CKD-EPI: 76 ML/MIN/1.73 M 2
GLUCOSE SERPL-MCNC: 143 MG/DL (ref 65–99)
HCT VFR BLD AUTO: 46.7 % (ref 42–52)
HGB BLD-MCNC: 15.3 G/DL (ref 14–18)
IMM GRANULOCYTES # BLD AUTO: 0.06 K/UL (ref 0–0.11)
IMM GRANULOCYTES NFR BLD AUTO: 0.7 % (ref 0–0.9)
LV EJECT FRACT  99904: 55
LYMPHOCYTES # BLD AUTO: 2.78 K/UL (ref 1–4.8)
LYMPHOCYTES NFR BLD: 33.3 % (ref 22–41)
MCH RBC QN AUTO: 28.4 PG (ref 27–33)
MCHC RBC AUTO-ENTMCNC: 32.8 G/DL (ref 32.3–36.5)
MCV RBC AUTO: 86.8 FL (ref 81.4–97.8)
MONOCYTES # BLD AUTO: 0.68 K/UL (ref 0–0.85)
MONOCYTES NFR BLD AUTO: 8.1 % (ref 0–13.4)
NEUTROPHILS # BLD AUTO: 4.66 K/UL (ref 1.82–7.42)
NEUTROPHILS NFR BLD: 55.9 % (ref 44–72)
NRBC # BLD AUTO: 0 K/UL
NRBC BLD-RTO: 0 /100 WBC (ref 0–0.2)
PLATELET # BLD AUTO: 236 K/UL (ref 164–446)
PMV BLD AUTO: 9.1 FL (ref 9–12.9)
POTASSIUM SERPL-SCNC: 3.8 MMOL/L (ref 3.6–5.5)
RBC # BLD AUTO: 5.38 M/UL (ref 4.7–6.1)
SODIUM SERPL-SCNC: 135 MMOL/L (ref 135–145)
WBC # BLD AUTO: 8.4 K/UL (ref 4.8–10.8)

## 2023-08-20 PROCEDURE — A9270 NON-COVERED ITEM OR SERVICE: HCPCS | Performed by: INTERNAL MEDICINE

## 2023-08-20 PROCEDURE — 700102 HCHG RX REV CODE 250 W/ 637 OVERRIDE(OP)

## 2023-08-20 PROCEDURE — 700102 HCHG RX REV CODE 250 W/ 637 OVERRIDE(OP): Performed by: INTERNAL MEDICINE

## 2023-08-20 PROCEDURE — 700105 HCHG RX REV CODE 258: Performed by: INTERNAL MEDICINE

## 2023-08-20 PROCEDURE — A9270 NON-COVERED ITEM OR SERVICE: HCPCS

## 2023-08-20 PROCEDURE — 99233 SBSQ HOSP IP/OBS HIGH 50: CPT | Performed by: STUDENT IN AN ORGANIZED HEALTH CARE EDUCATION/TRAINING PROGRAM

## 2023-08-20 PROCEDURE — 80048 BASIC METABOLIC PNL TOTAL CA: CPT

## 2023-08-20 PROCEDURE — 700111 HCHG RX REV CODE 636 W/ 250 OVERRIDE (IP): Performed by: INTERNAL MEDICINE

## 2023-08-20 PROCEDURE — 770020 HCHG ROOM/CARE - TELE (206)

## 2023-08-20 PROCEDURE — 85025 COMPLETE CBC W/AUTO DIFF WBC: CPT

## 2023-08-20 PROCEDURE — 99232 SBSQ HOSP IP/OBS MODERATE 35: CPT | Mod: 24 | Performed by: INTERNAL MEDICINE

## 2023-08-20 PROCEDURE — 700111 HCHG RX REV CODE 636 W/ 250 OVERRIDE (IP): Mod: JZ | Performed by: STUDENT IN AN ORGANIZED HEALTH CARE EDUCATION/TRAINING PROGRAM

## 2023-08-20 PROCEDURE — 36415 COLL VENOUS BLD VENIPUNCTURE: CPT

## 2023-08-20 RX ORDER — ENOXAPARIN SODIUM 100 MG/ML
40 INJECTION SUBCUTANEOUS DAILY
Status: DISCONTINUED | OUTPATIENT
Start: 2023-08-20 | End: 2023-08-21 | Stop reason: HOSPADM

## 2023-08-20 RX ORDER — CALCIUM CARBONATE 500 MG/1
500 TABLET, CHEWABLE ORAL 3 TIMES DAILY PRN
Status: DISCONTINUED | OUTPATIENT
Start: 2023-08-20 | End: 2023-08-21 | Stop reason: HOSPADM

## 2023-08-20 RX ADMIN — CEFAZOLIN 2 G: 2 INJECTION, POWDER, FOR SOLUTION INTRAMUSCULAR; INTRAVENOUS at 21:56

## 2023-08-20 RX ADMIN — OXYCODONE HYDROCHLORIDE 10 MG: 10 TABLET ORAL at 18:42

## 2023-08-20 RX ADMIN — OXYCODONE HYDROCHLORIDE 10 MG: 10 TABLET ORAL at 05:44

## 2023-08-20 RX ADMIN — ACETAMINOPHEN 1000 MG: 500 TABLET, FILM COATED ORAL at 12:21

## 2023-08-20 RX ADMIN — ACETAMINOPHEN 1000 MG: 500 TABLET, FILM COATED ORAL at 17:03

## 2023-08-20 RX ADMIN — ATORVASTATIN CALCIUM 40 MG: 40 TABLET, FILM COATED ORAL at 21:54

## 2023-08-20 RX ADMIN — CEFAZOLIN 2 G: 2 INJECTION, POWDER, FOR SOLUTION INTRAMUSCULAR; INTRAVENOUS at 15:39

## 2023-08-20 RX ADMIN — OXYCODONE HYDROCHLORIDE 10 MG: 10 TABLET ORAL at 08:35

## 2023-08-20 RX ADMIN — CARVEDILOL 12.5 MG: 12.5 TABLET, FILM COATED ORAL at 08:33

## 2023-08-20 RX ADMIN — CEFAZOLIN 2 G: 2 INJECTION, POWDER, FOR SOLUTION INTRAMUSCULAR; INTRAVENOUS at 05:45

## 2023-08-20 RX ADMIN — OXYCODONE HYDROCHLORIDE 10 MG: 10 TABLET ORAL at 21:54

## 2023-08-20 RX ADMIN — ENOXAPARIN SODIUM 40 MG: 100 INJECTION SUBCUTANEOUS at 17:02

## 2023-08-20 RX ADMIN — OXYCODONE HYDROCHLORIDE 10 MG: 10 TABLET ORAL at 00:03

## 2023-08-20 RX ADMIN — OXYCODONE HYDROCHLORIDE 10 MG: 10 TABLET ORAL at 15:39

## 2023-08-20 RX ADMIN — CARVEDILOL 12.5 MG: 12.5 TABLET, FILM COATED ORAL at 17:02

## 2023-08-20 RX ADMIN — SPIRONOLACTONE 25 MG: 25 TABLET ORAL at 05:45

## 2023-08-20 RX ADMIN — OXYCODONE HYDROCHLORIDE 10 MG: 10 TABLET ORAL at 12:21

## 2023-08-20 ASSESSMENT — ENCOUNTER SYMPTOMS
COUGH: 0
SHORTNESS OF BREATH: 0
CHILLS: 0
FEVER: 0
HEADACHES: 0
DIZZINESS: 0

## 2023-08-20 ASSESSMENT — PATIENT HEALTH QUESTIONNAIRE - PHQ9
1. LITTLE INTEREST OR PLEASURE IN DOING THINGS: NOT AT ALL
SUM OF ALL RESPONSES TO PHQ9 QUESTIONS 1 AND 2: 0
2. FEELING DOWN, DEPRESSED, IRRITABLE, OR HOPELESS: NOT AT ALL

## 2023-08-20 ASSESSMENT — PAIN SCALES - GENERAL: PAIN_LEVEL: 3

## 2023-08-20 ASSESSMENT — PAIN DESCRIPTION - PAIN TYPE
TYPE: ACUTE PAIN
TYPE: ACUTE PAIN

## 2023-08-20 ASSESSMENT — FIBROSIS 4 INDEX: FIB4 SCORE: 0.92

## 2023-08-20 NOTE — PROGRESS NOTES
Cardiology Follow Up Progress Note    Date of Service  8/20/2023    Attending Physician  Joey Red M.D.    Chief Complaint   Pacemaker pocket infection    HPI  Ricky Galdamez is a 53 y.o. male admitted 8/16/2023 with pacemaker pocket infection, chronic heart failure with recovered ejection fraction    Interim Events  No overnight complaints.  Patient is eager to be discharged on 8/21/2023 after his LifeVest fitting.  No acute events per nursing staff.  Telemetry monitor demonstrates sinus rhythm with first-degree AV block    Review of Systems  Review of Systems    Vital signs in last 24 hours  Temp:  [36.1 °C (97 °F)-36.6 °C (97.9 °F)] 36.1 °C (97 °F)  Pulse:  [65-80] 69  Resp:  [16-18] 16  BP: (109-119)/(72-78) 119/78  SpO2:  [93 %-96 %] 93 %    Physical Exam  Physical Exam  Constitutional:       Appearance: Normal appearance. He is obese.   HENT:      Head: Normocephalic and atraumatic.      Mouth/Throat:      Mouth: Mucous membranes are moist.      Pharynx: Oropharynx is clear.   Eyes:      Extraocular Movements: Extraocular movements intact.      Conjunctiva/sclera: Conjunctivae normal.   Cardiovascular:      Rate and Rhythm: Normal rate and regular rhythm.      Pulses: Normal pulses.      Heart sounds: Normal heart sounds. No murmur heard.     No friction rub. No gallop.      Comments: Left upper chest wall wound VAC status post pacemaker device explant  Pulmonary:      Effort: Pulmonary effort is normal.      Breath sounds: Normal breath sounds.   Abdominal:      General: Bowel sounds are normal.      Palpations: Abdomen is soft.   Musculoskeletal:         General: Normal range of motion.      Cervical back: Normal range of motion and neck supple.   Skin:     General: Skin is warm and dry.   Neurological:      General: No focal deficit present.      Mental Status: He is alert and oriented to person, place, and time. Mental status is at baseline.   Psychiatric:         Mood and Affect: Mood  "normal.         Behavior: Behavior normal.         Thought Content: Thought content normal.         Judgment: Judgment normal.         Lab Review  Lab Results   Component Value Date/Time    WBC 8.4 08/20/2023 12:28 AM    RBC 5.38 08/20/2023 12:28 AM    HEMOGLOBIN 15.3 08/20/2023 12:28 AM    HEMATOCRIT 46.7 08/20/2023 12:28 AM    MCV 86.8 08/20/2023 12:28 AM    MCH 28.4 08/20/2023 12:28 AM    MCHC 32.8 08/20/2023 12:28 AM    MPV 9.1 08/20/2023 12:28 AM      Lab Results   Component Value Date/Time    SODIUM 135 08/20/2023 12:28 AM    POTASSIUM 3.8 08/20/2023 12:28 AM    CHLORIDE 101 08/20/2023 12:28 AM    CO2 27 08/20/2023 12:28 AM    GLUCOSE 143 (H) 08/20/2023 12:28 AM    BUN 12 08/20/2023 12:28 AM    CREATININE 1.14 08/20/2023 12:28 AM      Lab Results   Component Value Date/Time    ASTSGOT 17 08/18/2023 02:04 AM    ALTSGPT 18 08/18/2023 02:04 AM     No results found for: \"CHOLSTRLTOT\", \"LDL\", \"HDL\", \"TRIGLYCERIDE\", \"TROPONINT\"    No results for input(s): \"NTPROBNP\" in the last 72 hours.        Assessment/Plan  1.  Infected pacemaker pocket with subsequent device extraction and wound VAC placement  2.  Heart failure with recovered ejection fraction  3.  Prior ventricular tachycardia with subsequent arrest requiring implantable cardiac defibrillator    Clinically, he is doing excellent and continues to recover from his recent pacemaker extraction and lead extraction.  He is currently followed closely by the primary medicine service as well as the wound care team and will receive antibiotics as currently directed.  The timing of his new implantable cardiac defibrillator remains to be determined he is followed closely by Dr. Deal in the outpatient setting regarding overall timing.  He will be fitted for a LifeVest prior to discharge and he will continue on his current medications which consist of Lipitor 40 mg daily, carvedilol 12.5 mg twice daily, Spironolactone 25 mg daily.  He is not demonstrating any acute heart " failure exacerbation symptoms.  Given that he has recovered his LV systolic function I would not recommend any changes to his current medications aside from what is currently documented  Thank you for allowing me to participate in the care of this patient.      Please contact me with any questions.    Leonel Quiñonez D.O.   Cardiologist, [facility]  [phone]

## 2023-08-20 NOTE — CARE PLAN
The patient is Stable - Low risk of patient condition declining or worsening    Shift Goals  Clinical Goals: Monitor wound vac, labs, vital, manage pain  Patient Goals: pain control, comfort  Family Goals: no family present    Progress made toward(s) clinical / shift goals:      Problem: Knowledge Deficit - Standard  Goal: Patient and family/care givers will demonstrate understanding of plan of care, disease process/condition, diagnostic tests and medications  Outcome: Progressing     Problem: Pain - Standard  Goal: Alleviation of pain or a reduction in pain to the patient’s comfort goal  Outcome: Progressing     Problem: Skin Integrity  Goal: Skin integrity is maintained or improved  Outcome: Progressing     Problem: Fall Risk  Goal: Patient will remain free from falls  Outcome: Progressing       Patient is not progressing towards the following goals:

## 2023-08-20 NOTE — CARE PLAN
The patient is Stable - Low risk of patient condition declining or worsening    Shift Goals  Clinical Goals: pain control  Patient Goals: pain control  Family Goals: no family present    Progress made toward(s) clinical / shift goals:  pain has been wekll controlled with oxy 10 Q 3 hours  Patient is not progressing towards the following goals:  Problem: Knowledge Deficit - Standard  Goal: Patient and family/care givers will demonstrate understanding of plan of care, disease process/condition, diagnostic tests and medications  Outcome: Progressing     Problem: Pain - Standard  Goal: Alleviation of pain or a reduction in pain to the patient’s comfort goal  Outcome: Progressing     Problem: Skin Integrity  Goal: Skin integrity is maintained or improved  Outcome: Progressing     Problem: Fall Risk  Goal: Patient will remain free from falls  Outcome: Progressing

## 2023-08-20 NOTE — ANESTHESIA POSTPROCEDURE EVALUATION
Patient: Ricky Galdamez    Procedure Summary     Date: 08/18/23 Room / Location: [facility]  Cath Lab [facility]    Anesthesia Start: 1326 Anesthesia Stop: 1540    Procedure: CL-ICD,PM,BIV LEAD EXTRACTION W/ ANESTHESIA Diagnosis:       Pacemaker infection, initial encounter (HCC)      Pacemaker infection, initial encounter (HCC)      (See Assoicated Dx)      (To be scheduled by EP , Pilar WILLS)    Scheduled Providers: Handy Deal M.D.; Mague Marquez M.D.; Junaid Jane M.D. Responsible Provider: Junaid Jane M.D.    Anesthesia Type: general ASA Status: 3          Final Anesthesia Type: general  Last vitals  BP   Blood Pressure: 119/78    Temp   36.1 °C (97 °F)    Pulse   69   Resp   16    SpO2   93 %      Anesthesia Post Evaluation    Patient location during evaluation: PACU  Patient participation: complete - patient participated  Level of consciousness: sleepy but conscious  Pain score: 3    Airway patency: patent  Anesthetic complications: no  Cardiovascular status: hemodynamically stable  Respiratory status: acceptable  Hydration status: balanced    PONV: none          There were no known notable events for this encounter.     Nurse Pain Score: 7 (NPRS)

## 2023-08-20 NOTE — PROGRESS NOTES
Hospital Medicine Daily Progress Note    Date of Service  8/19/2023    Chief Complaint  Ricky Galdamez is a 53 y.o. male admitted 8/16/2023 with pacemaker pocket infection    Hospital Course  Ricky Galdamez is a 53 y.o. male prior intraoperative cardiac arrest and history of HFrEF status post [employer] biventricular ICD pacemaker placement in 2017 in [place] and known nonobstructive coronary disease (40% mid LAD, 40% ramus and 30% left circumflex done on September 12, 2016 (, hyperlipidemia, hypertension who presented to our hospital with presumed pacemaker infection.  [facility] he was concerned for infection.  He has noted an approximately 1 cm diameter ulceration over the lateral incision site with foul-smelling yellowish purulent discharge.  He also has associated left pectoral cellulitis.  [facility] Dr. Deal who is a electrophysiologist and recommended the patient be transferred to our facility for higher level of care and likely need for explantation of pacemaker device and associated leads and cardiothoracic surgery backup.  Additionally I spoke with pharmacy here at [facility] and apparently the patient received a dose [facility] dapt[facility] reasons [facility].     Personally reviewed his outside labs [facility] the [facility] including a CBC, procalcitonin, blood cultures, BMP.  He had a sodium of 130 with a normal kidney function.  His white blood cell count was elevated at 14 with a hemoglobin of 16.7 and platelets of 236.  His procalcitonin was 0.15.  And his blood cultures are pending.  I personally reviewed all of these labs and studies on August 16.     Here in the ER he has remained stable and has not received any additional antibiotics.  The ER physician who I spoke with his consulted cardiology who is planning to see the patient this evening.     Patient is a retired medic.  He states that he noticed a scab over the pacemaker pocket several  [facility] Blood Collection Kit to patient  [employer]acking # [id number] days after he had his pacemaker upgraded to a biventricular pacemaker and ICD combination device implanted [facility]23 [facility].  It then changed to clear fluid drainage and then this morning became much more painful with yellowish purulent drainage.  This finally prompted [facility] as noted [facility].  Patient currently feels okay with no other associated symptoms.  Cultures of the wound have been obtained here in the ER.    Interval Problem Update  8/17/2023:  Patient been evaluated by electrophysiology in addition to cardiology and infectious disease recommendations are all appreciated.  Plan is to extract pacemaker device and patient will then be given a life jacket to wear/LifeVest.  This been ordered by electrophysiology.  Patient will subsequently have wound VAC placed.  Patient be placed on appropriate antimicrobials in the form of cefazolin.  We will follow-up with infectious disease regarding duration of therapy.  8/18/2023:  Patient underwent successful pacemaker device explantation with leads today with electrophysiology in OR and tolerated procedure well without issue.  Patient had appropriate wound dressings placed pocket was packed.  Patient will be closely followed by wound care team.  Continue present antimicrobial therapy infectious disease recommendations appreciated.  We will follow-up on culture results that were sent to lab today.  Otherwise continue to monitor on telemetry over the course the evening.  Patient is to have LifeVest placed for emergency defibrillation needs.  This is being facility by case management anticipate this will be placed and available on 8/19/2023.  Patient may require outpatient antibiotic infusions.  We will further coordinate this with infectious disease.  Otherwise continue present medical management.  Repeat labs in the morning.  8/19/2023:  Continue present medical management infectious these recommendations appreciated.  Patient was placed on  amoxicillin 1 g every 8 hours on day of discharge and will continue this for 10 days.  Appreciate wound care recommendations.  Wound VAC will likely be delivered on 8/21/2023.  Continue to work with case management to facilitate home health care and wound VAC exchanges.  Otherwise no acute events overnight continue present medical management.  I have discussed this patient's plan of care and discharge plan at IDT rounds today with Case Management, Nursing, Nursing leadership, and other members of the IDT team.    Consultants/Specialty  cardiology and infectious disease    Code Status  Full Code    Disposition  The patient is not medically cleared for discharge to home or a post-acute facility.  Anticipate discharge to: home with organized home healthcare and close outpatient follow-up    I have placed the appropriate orders for post-discharge needs.    Review of Systems  Review of Systems   Constitutional:  Positive for malaise/fatigue. Negative for chills and fever.   Respiratory:  Negative for cough and shortness of breath.    Cardiovascular:  Positive for chest pain. Negative for leg swelling.   Neurological:  Negative for dizziness and headaches.   All other systems reviewed and are negative.       Physical Exam  Temp:  [36.1 °C (97 °F)-36.8 °C (98.2 °F)] 36.4 °C (97.5 °F)  Pulse:  [64-88] 71  Resp:  [16-20] 16  BP: (101-126)/(71-78) 120/75  SpO2:  [92 %-94 %] 94 %    Physical Exam  Vitals reviewed.   Constitutional:       General: He is not in acute distress.     Appearance: He is well-developed.   HENT:      Head: Normocephalic and atraumatic.      Mouth/Throat:      Pharynx: No oropharyngeal exudate.   Eyes:      General: No scleral icterus.     Pupils: Pupils are equal, round, and reactive to light.   Neck:      Thyroid: No thyromegaly.   Cardiovascular:      Rate and Rhythm: Normal rate and regular rhythm.      Heart sounds: Normal heart sounds. No murmur heard.     Comments: L pectoral ppm pocket with  surrounding erythema and clear purulent yellow appearing discharge from the LUQ   Moderate TTP appreciated  Good radial pulses B/L  Pulmonary:      Effort: Pulmonary effort is normal. No respiratory distress.      Breath sounds: Normal breath sounds. No wheezing.   Chest:      Chest wall: Tenderness present.   Abdominal:      General: Bowel sounds are normal. There is no distension.      Palpations: Abdomen is soft.      Tenderness: There is no abdominal tenderness.   Musculoskeletal:         General: No tenderness. Normal range of motion.      Cervical back: Normal range of motion and neck supple.      Right lower leg: No edema.      Left lower leg: No edema.   Skin:     General: Skin is warm and dry.      Findings: No rash.   Neurological:      Mental Status: He is alert and oriented to person, place, and time.      Cranial Nerves: No cranial nerve deficit.         Fluids    Intake/Output Summary (Last 24 hours) at 8/19/2023 1746  Last data filed at 8/19/2023 1354  Gross per 24 hour   Intake 480 ml   Output 1150 ml   Net -670 ml         Laboratory  Recent Labs     08/17/23  0127 08/18/23  0204 08/19/23  0154   WBC 11.3* 7.5 8.4   RBC 5.30 5.31 5.46   HEMOGLOBIN 15.0 15.0 15.6   HEMATOCRIT 46.9 46.4 48.5   MCV 88.5 87.4 88.8   MCH 28.3 28.2 28.6   MCHC 32.0* 32.3 32.2*   RDW 39.4 39.3 38.8   PLATELETCT 202 208 236   MPV 9.3 9.2 9.1       Recent Labs     08/17/23  0127 08/18/23  0204 08/19/23  0154   SODIUM 132* 136 133*   POTASSIUM 4.0 4.1 4.6   CHLORIDE 99 101 99   CO2 22 26 26   GLUCOSE 108* 95 164*   BUN 28* 17 14   CREATININE 1.36 1.31 1.08   CALCIUM 8.6 8.8 8.7       Recent Labs     08/18/23  0204   INR 1.02                 Imaging  EC-DAVID W/O CONT         DX-CHEST-PORTABLE (1 VIEW)   Final Result      No acute cardiac or pulmonary abnormalities are identified following removal of the LEFT subclavian pacing device.      EC-ECHOCARDIOGRAM COMPLETE W/O CONT   Final Result      DX-CHEST-PORTABLE (1 VIEW)   Final  Result      1.  No acute cardiac or pulmonary abnormalities are identified.      CL-ICD,PM,BIV LEAD EXTRACTION W/ ANESTHESIA    (Results Pending)          Assessment/Plan  * Pacemaker infection, initial encounter ([facility])- (present on admission)  Assessment & Plan  Clearly infected  Transferred from the [facility], Dr Deal of EP to consult  Wound culture done here at [facility]  Blood cultures collected at [facility]  Received IV daptomycin X1 at the [facility], no prior history of low GAGE for MRSA with vancomycin nor VRE  IV vancomycin ordered here  Consult ID and cards in the AM  NPO @ 12 am  Vitals are stable  Likely will full explantation, does not appear to be PPM -dependent at this time    ACP (advance care planning)- (present on admission)  Assessment & Plan  I discussed advance care planning with the patient for at least 14 minutes, including diagnosis, prognosis, plan of care, risks and benefits of any therapies that could be offered, as well as alternatives including palliation and hospice, as appropriate. Patient is confirmed fc/fc    Chronic kidney disease (CKD), stage III (moderate) ([facility])- (present on admission)  Assessment & Plan  Cr is 1.5, unclear baseline  Monitor closely, will hold ACE for now and repeat BMP tomorrow, especially given his NPO status for possible intervention    Hyponatremia- (present on admission)  Assessment & Plan  Mild, trend, no clear symptoms, volume status appears euvolemic  If worsens, consider SIADH work up    HTN (hypertension)- (present on admission)  Assessment & Plan  Decently controlled  BB, ACE, monitor    HFrEF (heart failure with reduced ejection fraction) ([facility])- (present on admission)  Assessment & Plan  Occurred after intra-operative cardiac arrest while getting lap theresa 5 years ago  Per patient, full LVEF recovery has been achieved  Chronic at this time, no current exacerbation  Continue aldactone, toprol xl  Hold ACE and flozin  Hold lasix for now       Greater than 51 minutes spent  prepping to see patient (e.g. review of tests) obtaining and/or reviewing separately obtained history. Performing a medically appropriate examination and/ evaluation.  Counseling and educating the patient/family/caregiver.  Ordering medications, tests, or procedures.  Referring and communicating with other health care professionals.  Documenting clinical information in EPIC.  Independently interpreting results and communicating results to patient/family/caregiver.  Care coordination.    Please note that this dictation was created using voice recognition software. I have made every reasonable attempt to correct obvious errors, but I expect that there are errors of grammar and possibly context that I did not discover before finalizing the note.      VTE prophylaxis:   SCDs/TEDs   pharmacologic prophylaxis contraindicated due to Postsurgical procedure      I have performed a physical exam and reviewed and updated ROS and Plan today (8/19/2023). In review of yesterday's note (8/18/2023), there are no changes except as documented above.

## 2023-08-21 VITALS
SYSTOLIC BLOOD PRESSURE: 113 MMHG | HEART RATE: 56 BPM | RESPIRATION RATE: 18 BRPM | BODY MASS INDEX: 34.35 KG/M2 | DIASTOLIC BLOOD PRESSURE: 66 MMHG | TEMPERATURE: 97.5 F | OXYGEN SATURATION: 93 % | HEIGHT: 69 IN | WEIGHT: 231.92 LBS

## 2023-08-21 LAB
BACTERIA BLD CULT: NORMAL
BACTERIA BLD CULT: NORMAL
SIGNIFICANT IND 70042: NORMAL
SIGNIFICANT IND 70042: NORMAL
SITE SITE: NORMAL
SITE SITE: NORMAL
SOURCE SOURCE: NORMAL
SOURCE SOURCE: NORMAL

## 2023-08-21 PROCEDURE — 99024 POSTOP FOLLOW-UP VISIT: CPT | Performed by: NURSE PRACTITIONER

## 2023-08-21 PROCEDURE — 700102 HCHG RX REV CODE 250 W/ 637 OVERRIDE(OP): Performed by: INTERNAL MEDICINE

## 2023-08-21 PROCEDURE — 97605 NEG PRS WND THER DME<=50SQCM: CPT

## 2023-08-21 PROCEDURE — A9270 NON-COVERED ITEM OR SERVICE: HCPCS | Performed by: INTERNAL MEDICINE

## 2023-08-21 PROCEDURE — A9270 NON-COVERED ITEM OR SERVICE: HCPCS

## 2023-08-21 PROCEDURE — 302098 PASTE RING (FLAT): Performed by: STUDENT IN AN ORGANIZED HEALTH CARE EDUCATION/TRAINING PROGRAM

## 2023-08-21 PROCEDURE — 700102 HCHG RX REV CODE 250 W/ 637 OVERRIDE(OP)

## 2023-08-21 PROCEDURE — 700111 HCHG RX REV CODE 636 W/ 250 OVERRIDE (IP): Performed by: INTERNAL MEDICINE

## 2023-08-21 PROCEDURE — 700105 HCHG RX REV CODE 258: Performed by: INTERNAL MEDICINE

## 2023-08-21 PROCEDURE — 700111 HCHG RX REV CODE 636 W/ 250 OVERRIDE (IP): Mod: JZ | Performed by: STUDENT IN AN ORGANIZED HEALTH CARE EDUCATION/TRAINING PROGRAM

## 2023-08-21 PROCEDURE — 97602 WOUND(S) CARE NON-SELECTIVE: CPT

## 2023-08-21 PROCEDURE — 99239 HOSP IP/OBS DSCHRG MGMT >30: CPT | Performed by: STUDENT IN AN ORGANIZED HEALTH CARE EDUCATION/TRAINING PROGRAM

## 2023-08-21 RX ORDER — OXYCODONE HYDROCHLORIDE 5 MG/1
5 TABLET ORAL EVERY 8 HOURS PRN
Qty: 9 TABLET | Refills: 0 | Status: SHIPPED | OUTPATIENT
Start: 2023-08-21 | End: 2023-08-24

## 2023-08-21 RX ORDER — AMOXICILLIN 500 MG/1
1000 CAPSULE ORAL 3 TIMES DAILY
Qty: 60 CAPSULE | Refills: 0 | Status: ACTIVE | OUTPATIENT
Start: 2023-08-22 | End: 2023-09-01

## 2023-08-21 RX ADMIN — CEFAZOLIN 2 G: 2 INJECTION, POWDER, FOR SOLUTION INTRAMUSCULAR; INTRAVENOUS at 13:46

## 2023-08-21 RX ADMIN — ACETAMINOPHEN 1000 MG: 500 TABLET, FILM COATED ORAL at 00:39

## 2023-08-21 RX ADMIN — OXYCODONE HYDROCHLORIDE 10 MG: 10 TABLET ORAL at 00:54

## 2023-08-21 RX ADMIN — CARVEDILOL 12.5 MG: 12.5 TABLET, FILM COATED ORAL at 17:22

## 2023-08-21 RX ADMIN — CEFAZOLIN 2 G: 2 INJECTION, POWDER, FOR SOLUTION INTRAMUSCULAR; INTRAVENOUS at 05:08

## 2023-08-21 RX ADMIN — SPIRONOLACTONE 25 MG: 25 TABLET ORAL at 05:05

## 2023-08-21 RX ADMIN — ACETAMINOPHEN 1000 MG: 500 TABLET, FILM COATED ORAL at 17:23

## 2023-08-21 RX ADMIN — OXYCODONE HYDROCHLORIDE 10 MG: 10 TABLET ORAL at 05:05

## 2023-08-21 RX ADMIN — ACETAMINOPHEN 1000 MG: 500 TABLET, FILM COATED ORAL at 05:04

## 2023-08-21 RX ADMIN — ENOXAPARIN SODIUM 40 MG: 100 INJECTION SUBCUTANEOUS at 17:22

## 2023-08-21 RX ADMIN — CARVEDILOL 12.5 MG: 12.5 TABLET, FILM COATED ORAL at 08:07

## 2023-08-21 RX ADMIN — ACETAMINOPHEN 1000 MG: 500 TABLET, FILM COATED ORAL at 11:31

## 2023-08-21 RX ADMIN — OXYCODONE HYDROCHLORIDE 10 MG: 10 TABLET ORAL at 15:33

## 2023-08-21 RX ADMIN — OXYCODONE HYDROCHLORIDE 10 MG: 10 TABLET ORAL at 11:31

## 2023-08-21 RX ADMIN — OXYCODONE HYDROCHLORIDE 10 MG: 10 TABLET ORAL at 08:08

## 2023-08-21 ASSESSMENT — ENCOUNTER SYMPTOMS
CHEST TIGHTNESS: 0
WEAKNESS: 0
SPEECH DIFFICULTY: 0
CHILLS: 0
FATIGUE: 0
DIZZINESS: 0
NUMBNESS: 0
LIGHT-HEADEDNESS: 0
FEVER: 0
COUGH: 0
PALPITATIONS: 0
SHORTNESS OF BREATH: 0
PSYCHIATRIC NEGATIVE: 1
WHEEZING: 0

## 2023-08-21 NOTE — PROGRESS NOTES
Cardiology Follow Up Progress Note    Date of Service  8/21/2023    Attending Physician  Joey Red M.D.    Chief Complaint   Pacemaker pocket infection     HPI  Ricky Galdamez is a 53 y.o. male admitted 8/16/2023 from the [facility] with pacemaker pocket infection.  History of [employer] cRT-D implanted in 2017 after intraop cardiac arrest and NICM.  [facility] Deal in which he reports post procedure hematoma, eventually started to drain clear fluid which changes to purulent material leading up to admission.  He underwent successful CRT-D extraction with Dr. Deal 78/28/23.       Interim Events  Reports feeling well this AM.   Monitored rhythm is sinus with LBBB/first deg   Labs reviewed.  Micro with negative BC.  Wound culture positive for beta strep/staph epi.     Review of Systems  Review of Systems   Constitutional:  Negative for chills, fatigue and fever.   HENT:  Negative for nosebleeds and tinnitus.    Respiratory:  Negative for cough, chest tightness, shortness of breath and wheezing.    Cardiovascular:  Negative for chest pain, palpitations and leg swelling.   Musculoskeletal:         MIld tenderness at wound vac site    Skin: Negative.    Neurological:  Negative for dizziness, syncope, speech difficulty, weakness, light-headedness and numbness.   Psychiatric/Behavioral: Negative.     All other systems reviewed and are negative.      Vital signs in last 24 hours  Temp:  [36.1 °C (97 °F)-36.7 °C (98 °F)] 36.3 °C (97.3 °F)  Pulse:  [55-80] 60  Resp:  [14-20] 16  BP: (104-122)/(71-78) 122/77  SpO2:  [92 %-95 %] 95 %    Physical Exam  Physical Exam  Vitals and nursing note reviewed.   Constitutional:       Appearance: Normal appearance.   HENT:      Head: Normocephalic and atraumatic.      Mouth/Throat:      Mouth: Mucous membranes are moist.      Pharynx: Oropharynx is clear.   Eyes:      Extraocular Movements: Extraocular movements intact.       "Conjunctiva/sclera: Conjunctivae normal.      Pupils: Pupils are equal, round, and reactive to light.   Cardiovascular:      Rate and Rhythm: Normal rate and regular rhythm.      Pulses: Normal pulses.      Heart sounds: Normal heart sounds. No murmur heard.     No friction rub. No gallop.   Pulmonary:      Effort: Pulmonary effort is normal.      Breath sounds: Normal breath sounds. No wheezing, rhonchi or rales.   Chest:      Comments: Left chest CRT-D extraction site is C/D/I.  No erythema.   Wound vac intact.   Musculoskeletal:         General: Normal range of motion.      Cervical back: Normal range of motion.      Right lower leg: No edema.      Left lower leg: No edema.   Skin:     General: Skin is warm and dry.   Neurological:      Mental Status: He is alert and oriented to person, place, and time.      Gait: Gait normal.   Psychiatric:         Mood and Affect: Mood normal.         Behavior: Behavior normal.         Thought Content: Thought content normal.         Judgment: Judgment normal.         Lab Review  Lab Results   Component Value Date/Time    WBC 8.4 08/20/2023 12:28 AM    RBC 5.38 08/20/2023 12:28 AM    HEMOGLOBIN 15.3 08/20/2023 12:28 AM    HEMATOCRIT 46.7 08/20/2023 12:28 AM    MCV 86.8 08/20/2023 12:28 AM    MCH 28.4 08/20/2023 12:28 AM    MCHC 32.8 08/20/2023 12:28 AM    MPV 9.1 08/20/2023 12:28 AM      Lab Results   Component Value Date/Time    SODIUM 135 08/20/2023 12:28 AM    POTASSIUM 3.8 08/20/2023 12:28 AM    CHLORIDE 101 08/20/2023 12:28 AM    CO2 27 08/20/2023 12:28 AM    GLUCOSE 143 (H) 08/20/2023 12:28 AM    BUN 12 08/20/2023 12:28 AM    CREATININE 1.14 08/20/2023 12:28 AM      Lab Results   Component Value Date/Time    ASTSGOT 17 08/18/2023 02:04 AM    ALTSGPT 18 08/18/2023 02:04 AM     No results found for: \"CHOLSTRLTOT\", \"LDL\", \"HDL\", \"TRIGLYCERIDE\", \"TROPONINT\"    No results for input(s): \"NTPROBNP\" in the last 72 hours.    Cardiac Imaging and Procedures Review  Echocardiogram:  " 8/18/23  CONCLUSIONS  Intraoperative DAVID performed for ICD/PM lead removal procedure.  Small pericardial effusion (0.7cm) that remained unchanged throughout.  The left ventricle is normal in size and thickness. Mildly decreased   systolic function with ejection fraction visually estimated at 55%. No   distinct systolic wall motion abnormalities.  No valvular abnormalities.    Imaging    Assessment/Plan  CRT-D  pocket infection   S/P extraction of secondary prevention CRT-D system  H/O cardiac Arrest   Heart Failure with EF of 55%.     - S/P successful extraction of [employer] CRT-D system by Dr Deal 8/18/23 secondary to pocket infection.   - Blood cultures have remained negative. No leukocytosis or fevers.   Wound was positive for beta strep/staph epi.  He has been on IV abx per ID and he will be transitioned to PO Amoxicillin 1000 mg x 10 days at time of hospital DC per ID recommendations.   - He is not device dependent, but [facility] he did receive ATP therapy approximately I year ago.   - Planning for DC home with life vest.  Tentatively planning to schedule contralateral CRT-D 8/31/23 [facility].  Pt will be contacted with procedural based instructions after he is discharged.    - CM involved for VA auth for wound vac/HH which is currently pending approval.  Life vest to be fitted, [employer] team involved.      EP team will follow peripherally if he remains inpatient. Please call with any questions.   Discussed with Dr. Red.      KELLIE Ruiz   [facility]  [phone]

## 2023-08-21 NOTE — CARE PLAN
The patient is Stable - Low risk of patient condition declining or worsening    Shift Goals  Clinical Goals: discharge  Patient Goals: discharge  Family Goals: norberto; n/a at this time    Progress made toward(s) clinical / shift goals:  discharge pending life vest arrival and [facility] approval/ set up    Patient is not progressing towards the following goals:      Problem: Knowledge Deficit - Standard  Goal: Patient and family/care givers will demonstrate understanding of plan of care, disease process/condition, diagnostic tests and medications  Outcome: Progressing     Problem: Pain - Standard  Goal: Alleviation of pain or a reduction in pain to the patient’s comfort goal  Outcome: Progressing     Problem: Skin Integrity  Goal: Skin integrity is maintained or improved  Outcome: Progressing     Problem: Fall Risk  Goal: Patient will remain free from falls  Outcome: Progressing     Problem: Infection - Standard  Goal: Patient will remain free from infection  Outcome: Progressing     Problem: Wound/ / Incision Healing  Goal: Patient's wound/surgical incision will decrease in size and heals properly  Outcome: Progressing

## 2023-08-21 NOTE — WOUND TEAM
Assisted Rosa RAMSEY (Wound RN), with wound care, non-selective debridement performed using wound cleanser/NS and gauze. Please see Rosa RAMSEY (Wound RN) wound note for further wound care details.

## 2023-08-21 NOTE — PROGRESS NOTES
Hospital Medicine Daily Progress Note    Date of Service  8/20/2023    Chief Complaint  Ricky Galdamez is a 53 y.o. male admitted 8/16/2023 with pacemaker pocket infection    Hospital Course  Ricky Galdamez is a 53 y.o. male prior intraoperative cardiac arrest and history of HFrEF status post [employer] biventricular ICD pacemaker placement in 2017 in [place] and known nonobstructive coronary disease (40% mid LAD, 40% ramus and 30% left circumflex done on September 12, 2016 (, hyperlipidemia, hypertension who presented to our hospital with presumed pacemaker infection.  [facility] he was concerned for infection.  He has noted an approximately 1 cm diameter ulceration over the lateral incision site with foul-smelling yellowish purulent discharge.  He also has associated left pectoral cellulitis.  [facility] Dr. Deal who is a electrophysiologist and recommended the patient be transferred to our facility for higher level of care and likely need for explantation of pacemaker device and associated leads and cardiothoracic surgery backup.  Additionally I spoke with pharmacy here at [facility] and apparently the patient received a dose of daptomycin for unclear reasons [facility] the [facility].     Personally reviewed his outside labs [facility] the [facility] including a CBC, procalcitonin, blood cultures, BMP.  He had a sodium of 130 with a normal kidney function.  His white blood cell count was elevated at 14 with a hemoglobin of 16.7 and platelets of 236.  His procalcitonin was 0.15.  And his blood cultures are pending.  I personally reviewed all of these labs and studies on August 16.     Here in the ER he has remained stable and has not received any additional antibiotics.  The ER physician who I spoke with his consulted cardiology who is planning to see the patient this evening.     Patient is a retired medic.  He states that he noticed a scab over the pacemaker pocket several  days after he had his pacemaker upgraded to a biventricular pacemaker and ICD combination device [facility].  It then changed to clear fluid drainage and then this morning became much more painful with yellowish purulent drainage.  This finally prompted [facility] to the [facility] as noted above.  Patient currently feels okay with no other associated symptoms.  Cultures of the wound have been obtained here in the ER.    Interval Problem Update  8/17/2023:  Patient been evaluated by electrophysiology in addition to cardiology and infectious disease recommendations are all appreciated.  Plan is to extract pacemaker device and patient will then be given a life jacket to wear/LifeVest.  This been ordered by electrophysiology.  Patient will subsequently have wound VAC placed.  Patient be placed on appropriate antimicrobials in the form of cefazolin.  We will follow-up with infectious disease regarding duration of therapy.  8/18/2023:  Patient underwent successful pacemaker device explantation with leads today with electrophysiology in OR and tolerated procedure well without issue.  Patient had appropriate wound dressings placed pocket was packed.  Patient will be closely followed by wound care team.  Continue present antimicrobial therapy infectious disease recommendations appreciated.  We will follow-up on culture results that were sent to lab today.  Otherwise continue to monitor on telemetry over the course the evening.  Patient is to have LifeVest placed for emergency defibrillation needs.  This is being facility by case management anticipate this will be placed and available on 8/19/2023.  Patient may require outpatient antibiotic infusions.  We will further coordinate this with infectious disease.  Otherwise continue present medical management.  Repeat labs in the morning.  8/19/2023:  Continue present medical management infectious these recommendations appreciated.  Patient was placed on  amoxicillin 1 g every 8 hours on day of discharge and will continue this for 10 days.  Appreciate wound care recommendations.  Wound VAC will likely be delivered on 8/21/2023.  Continue to work with case management to facilitate home health care and wound VAC exchanges.  Otherwise no acute events overnight continue present medical management.  8/20/2023:  Continue present antimicrobial therapy infectious these recommendations appreciated cardiology recommendation noted and appreciated.  Patient waiting to have wound VAC delivery was likely to take place morning of 8/20/2023 patient also waiting to have LifeVest fit which will take place on morning of 8/21/2023.  Awaiting for [facility] approval of home health.  Once LifeVest is placed patient may transition to [facility].  Otherwise no acute events overnight.  I have discussed this patient's plan of care and discharge plan at IDT rounds today with Case Management, Nursing, Nursing leadership, and other members of the IDT team.    Consultants/Specialty  cardiology and infectious disease    Code Status  Full Code    Disposition  The patient is not medically cleared for discharge to home or a post-acute facility.  Anticipate discharge to: home with organized home healthcare and close outpatient follow-up    I have placed the appropriate orders for post-discharge needs.    Review of Systems  Review of Systems   Constitutional:  Positive for malaise/fatigue. Negative for chills and fever.   Respiratory:  Negative for cough and shortness of breath.    Cardiovascular:  Positive for chest pain. Negative for leg swelling.   Neurological:  Negative for dizziness and headaches.   All other systems reviewed and are negative.       Physical Exam  Temp:  [36.1 °C (97 °F)-36.6 °C (97.9 °F)] 36.1 °C (97 °F)  Pulse:  [65-80] 69  Resp:  [16-18] 16  BP: (109-119)/(72-78) 119/78  SpO2:  [93 %-96 %] 93 %    Physical Exam  Vitals reviewed.   Constitutional:       General: He is not in acute  distress.     Appearance: He is well-developed.   HENT:      Head: Normocephalic and atraumatic.      Mouth/Throat:      Pharynx: No oropharyngeal exudate.   Eyes:      General: No scleral icterus.     Pupils: Pupils are equal, round, and reactive to light.   Neck:      Thyroid: No thyromegaly.   Cardiovascular:      Rate and Rhythm: Normal rate and regular rhythm.      Heart sounds: Normal heart sounds. No murmur heard.     Comments: L pectoral ppm pocket with surrounding erythema and clear purulent yellow appearing discharge from the LUQ   Moderate TTP appreciated  Good radial pulses B/L  Pulmonary:      Effort: Pulmonary effort is normal. No respiratory distress.      Breath sounds: Normal breath sounds. No wheezing.   Chest:      Chest wall: Tenderness present.   Abdominal:      General: Bowel sounds are normal. There is no distension.      Palpations: Abdomen is soft.      Tenderness: There is no abdominal tenderness.   Musculoskeletal:         General: No tenderness. Normal range of motion.      Cervical back: Normal range of motion and neck supple.      Right lower leg: No edema.      Left lower leg: No edema.   Skin:     General: Skin is warm and dry.      Findings: No rash.   Neurological:      Mental Status: He is alert and oriented to person, place, and time.      Cranial Nerves: No cranial nerve deficit.         Fluids    Intake/Output Summary (Last 24 hours) at 8/20/2023 1825  Last data filed at 8/20/2023 1609  Gross per 24 hour   Intake 1470 ml   Output 1500 ml   Net -30 ml         Laboratory  Recent Labs     08/18/23 0204 08/19/23 0154 08/20/23  0028   WBC 7.5 8.4 8.4   RBC 5.31 5.46 5.38   HEMOGLOBIN 15.0 15.6 15.3   HEMATOCRIT 46.4 48.5 46.7   MCV 87.4 88.8 86.8   MCH 28.2 28.6 28.4   MCHC 32.3 32.2* 32.8   RDW 39.3 38.8 38.2   PLATELETCT 208 236 236   MPV 9.2 9.1 9.1       Recent Labs     08/18/23 0204 08/19/23 0154 08/20/23  0028   SODIUM 136 133* 135   POTASSIUM 4.1 4.6 3.8   CHLORIDE 101 99  101   CO2 26 26 27   GLUCOSE 95 164* 143*   BUN 17 14 12   CREATININE 1.31 1.08 1.14   CALCIUM 8.8 8.7 8.2*       Recent Labs     08/18/23  0204   INR 1.02                 Imaging  EC-DAVID W/O CONT   Final Result      DX-CHEST-PORTABLE (1 VIEW)   Final Result      No acute cardiac or pulmonary abnormalities are identified following removal of the LEFT subclavian pacing device.      EC-ECHOCARDIOGRAM COMPLETE W/O CONT   Final Result      DX-CHEST-PORTABLE (1 VIEW)   Final Result      1.  No acute cardiac or pulmonary abnormalities are identified.      CL-ICD,PM,BIV LEAD EXTRACTION W/ ANESTHESIA    (Results Pending)          Assessment/Plan  * Pacemaker infection, initial encounter ([facility])- (present on admission)  Assessment & Plan  Clearly infected  Transferred from the [facility], Dr Deal of EP to consult  Wound culture done here at [facility]  Blood cultures collected at [facility]  Received IV daptomycin X1 at the [facility], no prior history of low GAGE for MRSA with vancomycin nor VRE  IV vancomycin ordered here  Consult ID and cards in the AM  NPO @ 12 am  Vitals are stable  Likely will full explantation, does not appear to be PPM -dependent at this time    ACP (advance care planning)- (present on admission)  Assessment & Plan  I discussed advance care planning with the patient for at least 14 minutes, including diagnosis, prognosis, plan of care, risks and benefits of any therapies that could be offered, as well as alternatives including palliation and hospice, as appropriate. Patient is confirmed fc/fc    Chronic kidney disease (CKD), stage III (moderate) ([facility])- (present on admission)  Assessment & Plan  Cr is 1.5, unclear baseline  Monitor closely, will hold ACE for now and repeat BMP tomorrow, especially given his NPO status for possible intervention    Hyponatremia- (present on admission)  Assessment & Plan  Mild, trend, no clear symptoms, volume status appears euvolemic  If worsens, consider SIADH work up    HTN (hypertension)-  (present on admission)  Assessment & Plan  Decently controlled  BB, ACE, monitor    HFrEF (heart failure with reduced ejection fraction) ([facility])- (present on admission)  Assessment & Plan  Occurred after intra-operative cardiac arrest while getting lap theresa 5 years ago  Per patient, full LVEF recovery has been achieved  Chronic at this time, no current exacerbation  Continue aldactone, toprol xl  Hold ACE and flozin  Hold lasix for now       Greater than 51 minutes spent prepping to see patient (e.g. review of tests) obtaining and/or reviewing separately obtained history. Performing a medically appropriate examination and/ evaluation.  Counseling and educating the patient/family/caregiver.  Ordering medications, tests, or procedures.  Referring and communicating with other health care professionals.  Documenting clinical information in EPIC.  Independently interpreting results and communicating results to patient/family/caregiver.  Care coordination.    Please note that this dictation was created using voice recognition software. I have made every reasonable attempt to correct obvious errors, but I expect that there are errors of grammar and possibly context that I did not discover before finalizing the note.      VTE prophylaxis:   SCDs/TEDs   enoxaparin ppx      I have performed a physical exam and reviewed and updated ROS and Plan today (8/20/2023). In review of yesterday's note (8/19/2023), there are no changes except as documented above.

## 2023-08-21 NOTE — DISCHARGE PLANNING
LSW reached out the [facility] regarding insurance auth for this pt. Per Narcisa, auth has not begun on pt yet. Narcisa to start it right now; unlikely they will get same day auth. Per wound care RN, home wound vac has been delivered. Pending  insurance auth and life vest.    1525 LSW met with pt at bedside regarding pending life vest. Pt reports feeling frustrated, LSW verbalizes understanding. LSW will get in contact with Dunia Ferguson.    1530 LSW contacted Dunia with [facility]. Per Dunia, there is a possibility for pt to get life vest this afternoon. Dunia to call back this LSW regarding availability for FILI Olivas to put on life vest.    1533 Per Dunia, FILI Gonzalez is unavailable. Dunia to call back LSW regarding her availability to put life vest on pt.    1700 LSW contacted [facility]; left a message to update LSW on insurance auth and to reach out to pt.

## 2023-08-21 NOTE — WOUND TEAM
[facility]  Inpatient Services  Wound and Skin Care Follow-up    Admission Date: 8/16/2023     Last order of IP CONSULT TO WOUND CARE was found on 8/18/2023 from Hospital Encounter on 8/16/2023     HPI, PMH, SH: Reviewed    No past surgical history on file.  Social History     Tobacco Use    Smoking status: Never    Smokeless tobacco: Never   Substance Use Topics    Alcohol use: Yes     Comment: very rarely     Chief Complaint   Patient presents with    Sent by MD     [facility]. Pt had pacemaker replaced beginning of July and has had ongoing issues, re occurrent issues since replacement      Diagnosis: Pacemaker infection, initial encounter ([facility]) [T82.7XXA]    Unit where seen by Wound Team: T722/01     WOUND FOLLOW UP RELATED TO:  left upper chest NPWT change       WOUND TEAM PLAN OF CARE - Frequency of Follow-up:   Nursing to follow dressing orders written for wound care. Contact wound team if area fails to progress, deteriorates or with any questions/concerns if something comes up before next scheduled follow up (See below as to whether wound is following and frequency of wound follow up)  Dressing changes by wound team:                   NPWT change 3 times weekly - left upper chest     WOUND HISTORY:       'Ricky Galdamez is a 53 y.o. male with a past medical history of presumptive nonischemic cardiomyopathy following biventricular AICD placement back in 2017 with subsequent generator change back in July 2023, dyslipidemia who presented 8/16/2023 with left AICD pocket infection.     He has been his usual state of health until his recent generator change back in July 2023 when he noted a very small scab with subsequent serous fluid drainage following removal of his last Steri-Strip.  He notes that the Steri-Strip fell off and there was a scab and slight probing demonstrated serous fluid drainage.  There was concern for a possible pocket hematoma  and he was advised to utilize warm compresses to the area.  He has not been seeing significant improvement and notes that over time he was started to see more of a purulent discharge without a foul odor.  He presented back to the [facility] for further assessment and was ultimately transferred to [facility] for further evaluation and likely device extract with lead extraction.  Currently he notes that he has not been experiencing any fevers chills.  He does not have any pain at the site but does note a track with subsequent drainage.  He is aware that the device will need extraction.  He recalls back in 2017 after he had a cardiac arrest and subsequently had a persistently depressed LV systolic function that initiation of his biventricular pacing saw improvement in his overall ejection fraction.  Records are not currently available during this consultation for full review and comment.  Patient is a very good historian regarding his overall timelines of his device therapy.'         WOUND ASSESSMENT/LDA  Wound 08/16/23 Full Thickness Wound Chest Lateral;Upper Left (Active)   Date First Assessed/Time First Assessed: 08/16/23 2230   Present on Original Admission: Yes  Hand Hygiene Completed: Yes  Primary Wound Type: Full Thickness Wound  Location: Chest  Wound Orientation: Lateral;Upper  Laterality: Left      Assessments 8/21/2023  9:00 AM   Wound Image     Site Assessment Red   Periwound Assessment Clean;Dry;Intact   Margins Unattached edges;Undefined edges   Closure Secondary intention   Drainage Amount Small   Drainage Description Serosanguineous   Treatments Cleansed;Irrigation;Nonselective debridement;Site care   Wound Cleansing Approved Wound Cleanser   Periwound Protectant Skin Protectant Wipes to Periwound;Paste Ring;Drape   Dressing Cleansing/Solutions Not Applicable   Dressing Options Wound Vac   Dressing Change/Treatment Frequency Monday, Wednesday, Friday, and As Needed    NEXT Dressing Change/Treatment Date 08/23/23   NEXT Weekly Photo (Inpatient Only) 08/28/23   Non-staged Wound Description Full thickness   Wound Length (cm) 1.6 cm   Wound Width (cm) 5 cm   Wound Depth (cm) 1.6 cm   Wound Surface Area (cm^2) 8 cm^2   Wound Volume (cm^3) 12.8 cm^3   Wound Healing % 52   Undermining (cm) 3.6 cm   Undermining of Wound, 1st Location From 1 o'clock;To 1 o'clock   Shape eliptical   Wound Odor None   Exposed Structures Connective tissue   WOUND NURSE ONLY - Time Spent with Patient (mins) 30       Negative Pressure Wound Therapy 08/21/23 Lateral Left (Active)   Placement Date/Time: 08/21/23 0924   Wound Orientation: Lateral  Laterality: Left      Assessments 8/21/2023  9:00 AM   NPWT Pump Mode / Pressure Setting Ulta;Continuous;125 mmHg   Dressing Type Small;Black Foam (Regular)   Number of Foam Pieces Used 1   NEXT Dressing Change/Treatment Date 08/23/23   WOUND NURSE ONLY - Time Spent with Patient (mins) 30        Vascular:    GEORGE:   No results found.    Lab Values:    Lab Results   Component Value Date/Time    WBC 8.4 08/20/2023 12:28 AM    RBC 5.38 08/20/2023 12:28 AM    HEMOGLOBIN 15.3 08/20/2023 12:28 AM    HEMATOCRIT 46.7 08/20/2023 12:28 AM    CREACTPROT 15.36 (H) 08/16/2023 05:02 PM    SEDRATEWES 16 08/16/2023 05:02 PM         Culture Results show:  Recent Results (from the past 720 hour(s))   Culture Wound With Gram Stain    Collection Time: 08/16/23  6:00 PM    Specimen: Abscess; Wound   Result Value Ref Range    Significant Indicator POS (POS)     Source WND     Site Left Chest     Culture Result Rare growth usual skin yarelis. (A)     Gram Stain Result Few WBCs.  No organisms seen.       Culture Result Beta Streptococcus Group C  Moderate growth   (A)        Pain Level/Medicated:  PO pain medications administered by bedside RN 1 hr prior       INTERVENTIONS BY WOUND TEAM:  Chart and images reviewed. Discussed with bedside RN. All areas of concern (based on picture review, LDA  review and discussion with bedside RN) have been thoroughly assessed. Documentation of areas based on significant findings. This RN in to assess patient. Performed standard wound care which includes appropriate positioning, dressing removal and non-selective debridement. Pictures and measurements obtained weekly if/when required.    Wound:  Left upper chest  Preparation for Dressing removal: Removed without difficulty  Cleansed/Non-selectively Debrided with:  Wound cleanser and Gauze  Michelle wound: Cleansed with Wound cleanser and Gauze, Prepped with No Sting, Paste Rings, and Drape  Primary Dressing:  Total 1 black foam to the wound bed   Secondary (Outer) Dressing: secured with TRAC pad and drape.  NPWT restarted at 125mmHg, no leaks detected.      Advanced Wound Care Discharge Planning  Number of Clinicians necessary to complete wound care: 1  Is patient requiring IV pain medications for dressing changes:  No   Length of time for dressing change 15 min. (This does not include chart review, pre-medication time, set up, clean up or time spent charting.)    Interdisciplinary consultation: Patient, Bedside RN (Michelle), Robyn CABRERA (Wound RN).    EVALUATION / RATIONALE FOR TREATMENT:     Date:  08/21/23  Wound Status:  Wound progressing as expected    Granulation tissue at the base of the wound, granulating well and contraction of tissue.  Measurements are ~52% contraction.  Continue with NPWT until surface then can transition to different dressing.   Patient likely discharging home today.      Date:  08/19/23  Wound Status:  Initial evaluation after pacemaker removal    Wound bed is majority red tissue with scant yellow tissue visible. Serosanguinous drainage. Michelle-wound is pink and intact, tender to the touch. NPWT with VF w/normal saline applied to assist with wound closure by secondary intention, management of bio-burden and exudate through mechanical debridement, and increase oxygenation and granulation tissue  production to wound bed. Spoke to Dr Red regarding wound status. Contacted Reji Gimenez, via voalte, regarding case management arranging for home vac and home health.      Date:  08/17/23  Wound Status:  Initial evaluation    Patient with open and infected pacemaker site to left upper chest. Wound opening is covered with yellow slough, pacemaker is easily felt with gently probing. Physicians are aware and pending pacemaker removal. Aquacel Ag Hydrofiber applied to manage bioburden, absorb exudate, and maintain a moist wound environment without laterally wicking exudate therefore reducing jones-wound maceration. Covered with silicone foam dressing.            Goals: Steady decrease in wound area and depth weekly.    NURSING PLAN OF CARE ORDERS:  No new orders this visit    NUTRITION RECOMMENDATIONS   Wound Team Recommendations:  N/A     DIET ORDERS (From admission to next 24h)       Start     Ordered    08/18/23 1757  Diet Order Diet: Cardiac  ALL MEALS        Question:  Diet:  Answer:  Cardiac    08/18/23 1756                    PREVENTATIVE INTERVENTIONS:   Q shift Farooq - performed per nursing policy  Q shift pressure point assessments - performed per nursing policy    Surface/Positioning  Standard/trauma mattress - Currently in Place    Offloading/Redistribution  Float Heels off Bed with Pillows - Currently in Place           Respiratory  N/A    Containment/Moisture Prevention    N/A    Mobilization      Up to chair, Ambulate , and Ambulating at Baseline     Anticipated discharge plans:  Home Health Care and Outpatient Wound Center        Vac Discharge Needs:  Vac Discharge plan is purely a recommendation from wound team and not a requirement for discharge unless otherwise stated by physician.  Regular Vac in use and continued at discharge

## 2023-08-21 NOTE — CARE PLAN
The patient is Stable - Low risk of patient condition declining or worsening    Shift Goals  Clinical Goals: pain mgmt; monitor L chest site and woundvac; safety  Patient Goals: pain mgmt  Family Goals: norberto; n/a at this time    Progress made toward(s) clinical / shift goals:  progressing     Problem: Knowledge Deficit - Standard  Goal: Patient and family/care givers will demonstrate understanding of plan of care, disease process/condition, diagnostic tests and medications  8/21/2023 0337 by Lorna Cooper R.N.  Outcome: Progressing     Problem: Pain - Standard  Goal: Alleviation of pain or a reduction in pain to the patient’s comfort goal  8/21/2023 0337 by Lorna Cooper R.N.  Outcome: Progressing    Problem: Skin Integrity  Goal: Skin integrity is maintained or improved  8/21/2023 0336 by Lorna Cooper R.N.  Outcome: Progressing     Problem: Fall Risk  Goal: Patient will remain free from falls  8/21/2023 0337 by Lorna Cooper R.N.  Outcome: Progressing    Problem: Infection - Standard  Goal: Patient will remain free from infection  Outcome: Progressing          Patient is not progressing towards the following goals:

## 2023-08-21 NOTE — PROGRESS NOTES
MONITOR SUMMARY:    RHYTHM: SR SB w/ 1st degree block   HEART RATE: 56-70bpm  ECTOPY: (r)PVC  MEASUREMENT:  .23/.16/.45

## 2023-08-22 DIAGNOSIS — T82.7XXD INFECTION OF PACEMAKER POCKET, SUBSEQUENT ENCOUNTER: ICD-10-CM

## 2023-08-22 DIAGNOSIS — Z86.74 H/O CARDIAC ARREST: ICD-10-CM

## 2023-08-22 LAB
BACTERIA WND AEROBE CULT: ABNORMAL
GRAM STN SPEC: ABNORMAL
SIGNIFICANT IND 70042: ABNORMAL
SITE SITE: ABNORMAL
SOURCE SOURCE: ABNORMAL

## 2023-08-22 NOTE — CARE PLAN
The patient is Stable - Low risk of patient condition declining or worsening    Shift Goals  Clinical Goals: discharge  Patient Goals: discharge  Family Goals: norberto; n/a at this time    Progress made toward(s) clinical / shift goals:  ,    Patient is not progressing towards the following goals:

## 2023-08-22 NOTE — DISCHARGE SUMMARY
Discharge Summary    CHIEF COMPLAINT ON ADMISSION  Chief Complaint   Patient presents with    Sent by MD     [facility]. Pt had pacemaker replaced beginning of July and has had ongoing issues, re occurrent issues since replacement        Reason for Admission  EMS     Admission Date  8/16/2023    CODE STATUS  Full Code    HPI & HOSPITAL COURSE  This is a 53 y.o. male here with pacemaker infection  Ricky Galdamez is a 53 y.o. male prior intraoperative cardiac arrest and history of HFrEF status post [employer] biventricular ICD pacemaker placement in 2017 in [place] and known nonobstructive coronary disease (40% mid LAD, 40% ramus and 30% left circumflex done on September 12, 2016 (, hyperlipidemia, hypertension who presented to our hospital with presumed pacemaker infection.  [facility] he was concerned for infection.  He has noted an approximately 1 cm diameter ulceration over the lateral incision site with foul-smelling yellowish purulent discharge.  He also has associated left pectoral cellulitis.  [facility] Dr. Deal who is a electrophysiologist and recommended the patient be transferred to our facility for higher level of care and likely need for explantation of pacemaker device and associated leads and cardiothoracic surgery backup.  Additionally I spoke with pharmacy here at [facility] and apparently the patient received a dose of daptomycin for unclear reasons [facility] the [facility].     Personally reviewed his outside labs at the [facility] including a CBC, procalcitonin, blood cultures, BMP.  He had a sodium of 130 with a normal kidney function.  His white blood cell count was elevated at 14 with a hemoglobin of 16.7 and platelets of 236.  His procalcitonin was 0.15.  And his blood cultures are pending.  I personally reviewed all of these labs and studies on August 16.     Here in the ER he has remained stable and has  not received any additional antibiotics.  The ER physician who I spoke with his consulted cardiology who is planning to see the patient this evening.     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  It then changed to clear fluid drainage and then this morning became much more painful with yellowish purulent drainage.  This finally prompted [facility] the [facility] as noted [facility].  Patient currently feels okay with no other associated symptoms.  Cultures of the wound have been obtained here in the ER.  Patient underwent surgical intervention had pacemaker device and also leads extracted tolerated procedure well without issue.  Patient was evaluated by infectious disease recommendations were appreciated.  Patient was initially placed on cefazolin while hospitalized will subsequently transition to amoxicillin 1 g every 8 hours for 10 days upon discharge.  These [facility].  Furthermore patient was given physical hardcopy prescription for oxycodone 5 mg every 8 hours as needed for pain for the total of 9 tablets.  Patient had LifeVest in place patient had home health care set up.  Therefore, he is discharged in good and stable condition to home with organized home healthcare and close outpatient follow-up.    The patient met 2-midnight criteria for an inpatient stay at the time of discharge.    Discharge Date  8/21/2023    FOLLOW UP ITEMS POST DISCHARGE  Take all medication as prescribed  Go to all follow-up appointments as indicated      DISCHARGE DIAGNOSES  Principal Problem:    Pacemaker infection, initial encounter ([facility]) (POA: Yes)  Active Problems:    HFrEF (heart failure with reduced ejection fraction) ([facility]) (POA: Yes)    HTN (hypertension) (POA: Yes)    Hyponatremia (POA: Yes)    Chronic kidney disease  (CKD), stage III (moderate) (HCC) (POA: Yes)    ACP (advance care planning) (POA: Yes)  Resolved Problems:    * No resolved hospital problems. *      FOLLOW UP  Future Appointments   Date Time Provider Department Center   8/25/2023 11:00 AM PACER CHECK-[facility] None     [facility]  [address]  [place] [id number]  [phone]          MEDICATIONS ON DISCHARGE     Medication List        START taking these medications        Instructions   amoxicillin 500 MG Caps  Start taking on: August 22, 2023  Commonly known as: Amoxil   Doctor's comments: Per infectious disease recommendations  Take 2 Capsules by mouth 3 times a day for 10 days.  Dose: 1,000 mg     oxyCODONE immediate-release 5 MG Tabs  Commonly known as: Roxicodone   Take 1 Tablet by mouth every 8 hours as needed for Severe Pain for up to 3 days.  Dose: 5 mg            CONTINUE taking these medications        Instructions   carvedilol 12.5 MG Tabs  Commonly known as: Coreg   Take 12.5 mg by mouth 2 times a day with meals.  Dose: 12.5 mg     Empagliflozin 25 MG Tabs   Take 12.5 mg by mouth 2 times a day.  Dose: 12.5 mg     furosemide 20 MG Tabs  Commonly known as: Lasix   Take 20 mg by mouth every morning.  Dose: 20 mg     Lipitor 40 MG Tabs  Generic drug: atorvastatin   Take 40 mg by mouth every evening.  Dose: 40 mg     lisinopril 40 MG tablet  Commonly known as: Prinivil   Take 40 mg by mouth every morning.  Dose: 40 mg     meloxicam 15 MG tablet  Commonly known as: Mobic   Take 15 mg by mouth every morning.  Dose: 15 mg     spironolactone 25 MG Tabs  Commonly known as: Aldactone   Take 25 mg by mouth every day.  Dose: 25 mg     VITAMIN D PO   Take 1 Tablet by mouth every day.  Dose: 1 Tablet              Allergies  No Known Allergies    DIET  Orders Placed This Encounter   Procedures    Diet Order Diet: Cardiac     Standing Status:   Standing     Number of Occurrences:   1     Order Specific Question:   Diet:     Answer:   Cardiac  [6]       ACTIVITY  As tolerated.  Weight bearing as tolerated    CONSULTATIONS  Urology  Infectious disease  Electrophysiology    PROCEDURES    Date of procedure: 8/18/2023     Procedure(s) Performed:   1) Transvenous ICD leads removal  2) Transvenous removal of pacing lead (coronary sinus lead)  3) ICD generator removal     Indication(s):  ICD infection    LABORATORY  Lab Results   Component Value Date    SODIUM 135 08/20/2023    POTASSIUM 3.8 08/20/2023    CHLORIDE 101 08/20/2023    CO2 27 08/20/2023    GLUCOSE 143 (H) 08/20/2023    BUN 12 08/20/2023    CREATININE 1.14 08/20/2023        Lab Results   Component Value Date    WBC 8.4 08/20/2023    HEMOGLOBIN 15.3 08/20/2023    HEMATOCRIT 46.7 08/20/2023    PLATELETCT 236 08/20/2023      Please note that this dictation was created using voice recognition software. I have made every reasonable attempt to correct obvious errors, but I expect that there are errors of grammar and possibly context that I did not discover before finalizing the note.    Total time of the discharge process exceeds 35 minutes.

## 2023-08-22 NOTE — DISCHARGE PLANNING
0738 LSW reached out to [facility] in regards to insurance auth. Per Suzie, auth still has not been granted.    1200 LSW reached out to [facility] in [facility]. Could not leave message; redirected to directory.    1500 LSW reached out to [facility] regarding insurance auth. Per Ladonna, auth has not gone through. M.D. must sign for auth to go through.    1530 LSW reached out to [facility] M[facility] Dayo Storm in discussion of insurance auth; could not leave message. Redirected to directory.    1545 LSW reached out to pt to discuss pt needing to go [facility]. Pt reported he would go tomorrow. LSW will call pt tomorrow to confirm.

## 2023-08-24 ENCOUNTER — TELEPHONE (OUTPATIENT)
Dept: CARDIOLOGY | Facility: MEDICAL CENTER | Age: 53
End: 2023-08-24
Payer: COMMERCIAL

## 2023-08-24 NOTE — TELEPHONE ENCOUNTER
Patient scheduled for BiV ICD insert on 8-31-23 with Dr. Deal. Patient has been instructed to check in at 9:00 for 11:00 case time. Message sent to authorizations. CHRISTAL Dunn with [employer]

## 2023-08-25 ENCOUNTER — NON-PROVIDER VISIT (OUTPATIENT)
Dept: CARDIOLOGY | Facility: MEDICAL CENTER | Age: 53
End: 2023-08-25
Attending: INTERNAL MEDICINE
Payer: COMMERCIAL

## 2023-08-25 NOTE — PROGRESS NOTES
Patient had device explanted 8/18--new implant scheduled 8/31/23.  Patient is being followed by home health for wound vac from explanted device.

## 2023-08-29 ENCOUNTER — TELEPHONE (OUTPATIENT)
Dept: CARDIOLOGY | Facility: MEDICAL CENTER | Age: 53
End: 2023-08-29
Payer: COMMERCIAL

## 2023-08-29 NOTE — TELEPHONE ENCOUNTER
SS (Placed Pacer in 08-)    Caller: Dayna (Nurse at the [facility])    Topic/issue: Dayna askew nurse at the [facility] called in regards to pt recent pacer that was placed stating that its infected and would like a call back to discuss wound please advise.      Callback Number: [phone] ext [phone]     Thank you,     Damien BURNS

## 2023-08-29 NOTE — TELEPHONE ENCOUNTER
S/w Dayna with [facility], Dayna stated they had their questions answered, they will be helping with wound care due to patient not being at home for home health, they placed a referral/consult to [facility] home health care.   Discussed with Dayna has new insert date of 8/31/23 with a wound vac in place.   All questions answered.

## 2023-08-30 ENCOUNTER — PRE-ADMISSION TESTING (OUTPATIENT)
Dept: ADMISSIONS | Facility: MEDICAL CENTER | Age: 53
End: 2023-08-30
Attending: INTERNAL MEDICINE
Payer: COMMERCIAL

## 2023-08-30 ENCOUNTER — TELEPHONE (OUTPATIENT)
Dept: CARDIOLOGY | Facility: MEDICAL CENTER | Age: 53
End: 2023-08-30
Payer: COMMERCIAL

## 2023-08-30 DIAGNOSIS — Z01.810 PRE-OPERATIVE CARDIOVASCULAR EXAMINATION: ICD-10-CM

## 2023-08-30 DIAGNOSIS — Z01.812 PRE-OPERATIVE LABORATORY EXAMINATION: ICD-10-CM

## 2023-08-30 LAB
ALBUMIN SERPL BCP-MCNC: 4.2 G/DL (ref 3.2–4.9)
ALBUMIN/GLOB SERPL: 1.4 G/DL
ALP SERPL-CCNC: 92 U/L (ref 30–99)
ALT SERPL-CCNC: 22 U/L (ref 2–50)
ANION GAP SERPL CALC-SCNC: 8 MMOL/L (ref 7–16)
AST SERPL-CCNC: 16 U/L (ref 12–45)
BILIRUB SERPL-MCNC: 0.2 MG/DL (ref 0.1–1.5)
BUN SERPL-MCNC: 25 MG/DL (ref 8–22)
CALCIUM ALBUM COR SERPL-MCNC: 9.7 MG/DL (ref 8.5–10.5)
CALCIUM SERPL-MCNC: 9.9 MG/DL (ref 8.5–10.5)
CHLORIDE SERPL-SCNC: 102 MMOL/L (ref 96–112)
CO2 SERPL-SCNC: 27 MMOL/L (ref 20–33)
CREAT SERPL-MCNC: 1.3 MG/DL (ref 0.5–1.4)
ERYTHROCYTE [DISTWIDTH] IN BLOOD BY AUTOMATED COUNT: 39.1 FL (ref 35.9–50)
GFR SERPLBLD CREATININE-BSD FMLA CKD-EPI: 65 ML/MIN/1.73 M 2
GLOBULIN SER CALC-MCNC: 3 G/DL (ref 1.9–3.5)
GLUCOSE SERPL-MCNC: 98 MG/DL (ref 65–99)
HCT VFR BLD AUTO: 52.1 % (ref 42–52)
HGB BLD-MCNC: 16.8 G/DL (ref 14–18)
INR PPP: 0.94 (ref 0.87–1.13)
MCH RBC QN AUTO: 28.1 PG (ref 27–33)
MCHC RBC AUTO-ENTMCNC: 32.2 G/DL (ref 32.3–36.5)
MCV RBC AUTO: 87.3 FL (ref 81.4–97.8)
PLATELET # BLD AUTO: 246 K/UL (ref 164–446)
PMV BLD AUTO: 9.9 FL (ref 9–12.9)
POTASSIUM SERPL-SCNC: 4.8 MMOL/L (ref 3.6–5.5)
PROT SERPL-MCNC: 7.2 G/DL (ref 6–8.2)
PROTHROMBIN TIME: 12.7 SEC (ref 12–14.6)
RBC # BLD AUTO: 5.97 M/UL (ref 4.7–6.1)
SODIUM SERPL-SCNC: 137 MMOL/L (ref 135–145)
WBC # BLD AUTO: 8.4 K/UL (ref 4.8–10.8)

## 2023-08-30 PROCEDURE — 93005 ELECTROCARDIOGRAM TRACING: CPT

## 2023-08-30 PROCEDURE — 85610 PROTHROMBIN TIME: CPT

## 2023-08-30 PROCEDURE — 36415 COLL VENOUS BLD VENIPUNCTURE: CPT

## 2023-08-30 PROCEDURE — 80053 COMPREHEN METABOLIC PANEL: CPT

## 2023-08-30 PROCEDURE — 85027 COMPLETE CBC AUTOMATED: CPT

## 2023-08-30 NOTE — TELEPHONE ENCOUNTER
[facility] request application signed and scanned in [facility].  Patient called and states will  8/30/23.

## 2023-08-31 ENCOUNTER — APPOINTMENT (OUTPATIENT)
Dept: CARDIOLOGY | Facility: MEDICAL CENTER | Age: 53
End: 2023-08-31
Attending: NURSE PRACTITIONER
Payer: COMMERCIAL

## 2023-08-31 ENCOUNTER — HOSPITAL ENCOUNTER (OUTPATIENT)
Facility: MEDICAL CENTER | Age: 53
End: 2023-09-01
Attending: INTERNAL MEDICINE | Admitting: INTERNAL MEDICINE
Payer: COMMERCIAL

## 2023-08-31 ENCOUNTER — APPOINTMENT (OUTPATIENT)
Dept: RADIOLOGY | Facility: MEDICAL CENTER | Age: 53
End: 2023-08-31
Attending: INTERNAL MEDICINE
Payer: COMMERCIAL

## 2023-08-31 DIAGNOSIS — Z86.74 H/O CARDIAC ARREST: ICD-10-CM

## 2023-08-31 DIAGNOSIS — T82.7XXD INFECTION OF PACEMAKER POCKET, SUBSEQUENT ENCOUNTER: ICD-10-CM

## 2023-08-31 PROBLEM — T82.7XXA ICD (IMPLANTABLE CARDIOVERTER-DEFIBRILLATOR) INFECTION, INITIAL ENCOUNTER (HCC): Status: ACTIVE | Noted: 2023-08-31

## 2023-08-31 LAB
EKG IMPRESSION: NORMAL
EKG IMPRESSION: NORMAL

## 2023-08-31 PROCEDURE — A9270 NON-COVERED ITEM OR SERVICE: HCPCS | Performed by: INTERNAL MEDICINE

## 2023-08-31 PROCEDURE — 33225 L VENTRIC PACING LEAD ADD-ON: CPT | Mod: 53 | Performed by: INTERNAL MEDICINE

## 2023-08-31 PROCEDURE — 99152 MOD SED SAME PHYS/QHP 5/>YRS: CPT | Performed by: INTERNAL MEDICINE

## 2023-08-31 PROCEDURE — G0378 HOSPITAL OBSERVATION PER HR: HCPCS

## 2023-08-31 PROCEDURE — 160035 HCHG PACU - 1ST 60 MINS PHASE I

## 2023-08-31 PROCEDURE — 700101 HCHG RX REV CODE 250

## 2023-08-31 PROCEDURE — 93010 ELECTROCARDIOGRAM REPORT: CPT | Performed by: INTERNAL MEDICINE

## 2023-08-31 PROCEDURE — 700111 HCHG RX REV CODE 636 W/ 250 OVERRIDE (IP): Mod: JZ

## 2023-08-31 PROCEDURE — 700102 HCHG RX REV CODE 250 W/ 637 OVERRIDE(OP): Performed by: INTERNAL MEDICINE

## 2023-08-31 PROCEDURE — 93005 ELECTROCARDIOGRAM TRACING: CPT | Performed by: INTERNAL MEDICINE

## 2023-08-31 PROCEDURE — 33249 INSJ/RPLCMT DEFIB W/LEAD(S): CPT | Performed by: INTERNAL MEDICINE

## 2023-08-31 PROCEDURE — C1887 CATHETER, GUIDING: HCPCS

## 2023-08-31 PROCEDURE — 160002 HCHG RECOVERY MINUTES (STAT)

## 2023-08-31 PROCEDURE — 160036 HCHG PACU - EA ADDL 30 MINS PHASE I

## 2023-08-31 PROCEDURE — 71045 X-RAY EXAM CHEST 1 VIEW: CPT

## 2023-08-31 RX ORDER — MIDAZOLAM HYDROCHLORIDE 1 MG/ML
INJECTION INTRAMUSCULAR; INTRAVENOUS
Status: COMPLETED
Start: 2023-08-31 | End: 2023-08-31

## 2023-08-31 RX ORDER — ONDANSETRON 2 MG/ML
4 INJECTION INTRAMUSCULAR; INTRAVENOUS EVERY 6 HOURS PRN
Status: DISCONTINUED | OUTPATIENT
Start: 2023-08-31 | End: 2023-09-01 | Stop reason: HOSPADM

## 2023-08-31 RX ORDER — CEFAZOLIN SODIUM 1 G/3ML
INJECTION, POWDER, FOR SOLUTION INTRAMUSCULAR; INTRAVENOUS
Status: COMPLETED
Start: 2023-08-31 | End: 2023-08-31

## 2023-08-31 RX ORDER — LISINOPRIL 20 MG/1
40 TABLET ORAL EVERY MORNING
Status: DISCONTINUED | OUTPATIENT
Start: 2023-09-01 | End: 2023-09-01 | Stop reason: HOSPADM

## 2023-08-31 RX ORDER — BUPIVACAINE HYDROCHLORIDE 2.5 MG/ML
INJECTION, SOLUTION EPIDURAL; INFILTRATION; INTRACAUDAL
Status: COMPLETED
Start: 2023-08-31 | End: 2023-08-31

## 2023-08-31 RX ORDER — LIDOCAINE HYDROCHLORIDE 20 MG/ML
INJECTION, SOLUTION INFILTRATION; PERINEURAL
Status: COMPLETED
Start: 2023-08-31 | End: 2023-08-31

## 2023-08-31 RX ORDER — HYDROCODONE BITARTRATE AND ACETAMINOPHEN 5; 325 MG/1; MG/1
1 TABLET ORAL EVERY 4 HOURS PRN
Status: DISCONTINUED | OUTPATIENT
Start: 2023-08-31 | End: 2023-09-01 | Stop reason: HOSPADM

## 2023-08-31 RX ORDER — CARVEDILOL 12.5 MG/1
12.5 TABLET ORAL 2 TIMES DAILY WITH MEALS
Status: DISCONTINUED | OUTPATIENT
Start: 2023-08-31 | End: 2023-09-01 | Stop reason: HOSPADM

## 2023-08-31 RX ORDER — ATORVASTATIN CALCIUM 40 MG/1
40 TABLET, FILM COATED ORAL NIGHTLY
Status: DISCONTINUED | OUTPATIENT
Start: 2023-08-31 | End: 2023-09-01 | Stop reason: HOSPADM

## 2023-08-31 RX ORDER — SODIUM CHLORIDE 9 MG/ML
1000 INJECTION, SOLUTION INTRAVENOUS
Status: ACTIVE | OUTPATIENT
Start: 2023-08-31 | End: 2023-09-01

## 2023-08-31 RX ORDER — FUROSEMIDE 20 MG/1
20 TABLET ORAL EVERY MORNING
Status: DISCONTINUED | OUTPATIENT
Start: 2023-09-01 | End: 2023-09-01 | Stop reason: HOSPADM

## 2023-08-31 RX ORDER — ACETAMINOPHEN 325 MG/1
650 TABLET ORAL EVERY 4 HOURS PRN
Status: DISCONTINUED | OUTPATIENT
Start: 2023-08-31 | End: 2023-09-01 | Stop reason: HOSPADM

## 2023-08-31 RX ORDER — DAPAGLIFLOZIN 10 MG/1
10 TABLET, FILM COATED ORAL 2 TIMES DAILY
Status: DISCONTINUED | OUTPATIENT
Start: 2023-08-31 | End: 2023-09-01 | Stop reason: HOSPADM

## 2023-08-31 RX ORDER — SPIRONOLACTONE 25 MG/1
25 TABLET ORAL DAILY
Status: DISCONTINUED | OUTPATIENT
Start: 2023-09-01 | End: 2023-09-01 | Stop reason: HOSPADM

## 2023-08-31 RX ADMIN — DAPAGLIFLOZIN 10 MG: 10 TABLET, FILM COATED ORAL at 18:40

## 2023-08-31 RX ADMIN — CARVEDILOL 12.5 MG: 12.5 TABLET, FILM COATED ORAL at 18:40

## 2023-08-31 RX ADMIN — MIDAZOLAM HYDROCHLORIDE 2 MG: 1 INJECTION, SOLUTION INTRAMUSCULAR; INTRAVENOUS at 14:05

## 2023-08-31 RX ADMIN — ATORVASTATIN CALCIUM 40 MG: 40 TABLET, FILM COATED ORAL at 20:06

## 2023-08-31 RX ADMIN — FENTANYL CITRATE 200 MCG: 50 INJECTION, SOLUTION INTRAMUSCULAR; INTRAVENOUS at 14:05

## 2023-08-31 RX ADMIN — LIDOCAINE HYDROCHLORIDE: 20 INJECTION, SOLUTION INFILTRATION; PERINEURAL at 13:15

## 2023-08-31 RX ADMIN — BUPIVACAINE HYDROCHLORIDE: 2.5 INJECTION, SOLUTION EPIDURAL; INFILTRATION; INTRACAUDAL at 14:05

## 2023-08-31 RX ADMIN — HYDROCODONE BITARTRATE AND ACETAMINOPHEN 1 TABLET: 5; 325 TABLET ORAL at 15:42

## 2023-08-31 RX ADMIN — HYDROCODONE BITARTRATE AND ACETAMINOPHEN 1 TABLET: 5; 325 TABLET ORAL at 20:06

## 2023-08-31 RX ADMIN — ACETAMINOPHEN 650 MG: 325 TABLET, FILM COATED ORAL at 23:23

## 2023-08-31 RX ADMIN — CEFAZOLIN 3000 MG: 1 INJECTION, POWDER, FOR SOLUTION INTRAMUSCULAR; INTRAVENOUS at 13:23

## 2023-08-31 ASSESSMENT — PAIN DESCRIPTION - PAIN TYPE
TYPE: SURGICAL PAIN
TYPE: ACUTE PAIN

## 2023-08-31 ASSESSMENT — FIBROSIS 4 INDEX
FIB4 SCORE: 0.75
FIB4 SCORE: 0.75

## 2023-08-31 NOTE — OP REPORT
Electrophysiology Procedure Note  [facility]    Patient ID:  Name:             Ricky Galdamez   YOB: 1970  Age:                 53 y.o.  male   MRN:               [id number]    Date of procedure: 8/31/2023     Procedure(s) Performed:   ICD implant  Coronary sinus lead placement (abandoned procedure due to anatomic difficulty accessing coronary sinus)    Indication(s):  History of chronic systolic CHF  Ventricular tachycardia    Physician(s): Handy Deal M.D.     Resident/Assistant(s): None     Anesthesia: Local and moderate sedation (start time 1331, stop time 1458, total dose given 6 mg IV versed, 200 mcg IV fentanyl)     Specimen(s) Removed: None     Estimated Blood Loss:  30cc    Complications: None.    DESCRIPTION OF PROCEDURE: After informed written consent, the patient was brought to the electrophysiology lab in the fasting, unsedated state. The patient was prepped and draped in the usual sterile fashion. The procedure was performed under moderate sedation with local anesthetic. A  right upper extremity venogram was performed, demonstrating a patent subclavian vein. A right infraclavicular incision was made with a scalpel and the pre-pectoral device pocket was created using a combination of blunt dissection and electrocautery. The modified Seldinger technique was used to gain access to the right axillary vein. A peel-away hemostasis sheath was placed in the vein. Under fluoroscopic guidance, the ICD lead was introduced into the heart. The ventricular lead was advanced into the RVOT position the pulled back and advanced to the RV apex. The lead was tested and had satisfactory sensing and pacing parameters. High output ventricular pacing did not produce extracardiac stimulation. The lead was sutured to the underlying pectoral muscle with interrupted silk over a silastic suture sleeve. We tried to access the coronary sinus for LV pacing however despite attempts with  multiple tools including R sided SJM sheath, multipurpose sheath, CAS2, AL1, Barbeau coronary catheters, deflectable CS EP catheter, we were unable to advance the R sided outer sheath into the CS. We eventually moved on to the RA lead. Of note during our attempts at accessing the coronary sinus, we bumped the right bundle and patient developed complete heart block and we paced via the RV lead. The RA lead was advanced to the RAA. The lead was tested and had satisfactory sensing and pacing parameters. High output ventricular pacing did not produce extracardiac stimulation. The lead was sutured to the underlying pectoral muscle with interrupted silk over a silastic suture sleeve. The device pocket was irrigated with antibiotic solution, inspected, and no bleeding was seen. The lead was connected to the ICD pulse generator and the device was inserted into the pocket with a Tyrex antibiotic sleeve. The wound was closed with three layers of absorbable sutures and covered with Steri-Strips. Following recovery from sedation, the patient was transferred to a monitored bed in good condition.    IMPLANTED DEVICE INFORMATION:    Pulse generator is a qLearning model ITDVS960W   Serial number [id number]      LEAD INFORMATION:  1. Right atrial lead is a SJM model [id number]/46, serial number [id number], P wave 1.6 millivolts, threshold 1.5 Volts at 0.5 milliseconds, pacing impedance 633 Ohms.  2. Right ventricular lead is a SJM model [id number], serial number [id number], R wave 7.6 millivolts, threshold 1.25 Volts at 0.5 milliseconds, pacing impedance 640 Ohms.     DEVICE PROGRAMMING:    Jostin therapy: DDD   Tachy therapy:    VF zone  250 bpm, defib 35 J x 6  VT zone 200 bpm, ATP x 3 then, CDV 35 J x 5    DEFIBRILLATION THRESHOLD TESTING:    Deferred    FLUOROSCOPY TIME: 39.7 minutes    SUMMARY/CONCLUSIONS:  1. Successful automatic implantable cardioverter defibrillator implant.  2. Unsuccessful CS lead placement due to unfavorable  anatomy    RECOMMENDATIONS:  1. Admit to monitored bed.  2. Chest x-ray.  3. Implantable cardioverter defibrillator interrogation prior to hospital discharge.  4. Follow-up in device clinic for wound check and device interrogation.  5. Echo in 2-3 mo if LV function more compromised consider referral for epicardial lead

## 2023-08-31 NOTE — PROGRESS NOTES
"Med Rec complete per patient   Allergies reviewed  Preferred [facility]: [facility] on [address]    Pt states he completed his Antibiotic course today this morning around 0530    Pt states he has a \"Wound Vacuum\" placed on his chest   "

## 2023-08-31 NOTE — H&P
"EP Pre-procedure History and Physical    Date of service: 8/31/2023    Reason for visit/Chief complaint: ICD infection    HPI:   52 yo M. History of CRT-D s/p explant on the L for infection. Here for re-implant on the R.    Past Medical History:   Diagnosis Date    Adverse effect of anesthesia     Coded during gallbladder surgery - around 2013/2014.  Surgeries since then without any problems.    Breath shortness 08/30/2023    With exertion.    Chronic pain     Congestive heart failure (HCC)     Dental disorder 08/30/2023    Missing teeth.    Disorder of thyroid 08/30/2023    Keeping an eye on a small nodule.    Heart burn 08/30/2023    Occ.    High cholesterol     Hyperlipidemia     Hypertension     Pacemaker     Psychiatric problem 08/30/2023    PTSD - problems sleeping.     Past Surgical History:   Procedure Laterality Date    CHOLECYSTECTOMY      OTHER CARDIAC SURGERY      Several pacemaker procedures.     History reviewed. No pertinent family history.    Physical Exam:  Vitals:    08/31/23 0927   BP: 120/71   Pulse: 66   Resp: 16   Temp: 36.1 °C (97 °F)   TempSrc: Temporal   SpO2: 98%   Weight: 96.8 kg (213 lb 6.5 oz)   Height: 1.753 m (5' 9\")     Gen: NAD, conversant  HEENT: PERRL, EOMI  LUNGS: CTA B, no w/r/r  CV: RRR, no m/r/g, no JVD  Abd: Soft, NT/ND, +BS  Ext: no edema, warm and well perfused    Labs reviewed    EKG interpreted by me:  Sinus rhythm    Impression/Recs:  1. H/O cardiac arrest  CL-BIV IMPLANTABLE CARDIOVERTER DEFIBRILLATOR    CL-BIV IMPLANTABLE CARDIOVERTER DEFIBRILLATOR      2. Infection of pacemaker pocket, subsequent encounter  CL-BIV IMPLANTABLE CARDIOVERTER DEFIBRILLATOR    CL-BIV IMPLANTABLE CARDIOVERTER DEFIBRILLATOR        -Risks/benefits/alternatives discussed  -All questions answered  -Proceed with R sided ICD re-implant    Handy Deal MD    "

## 2023-08-31 NOTE — DISCHARGE INSTRUCTIONS
ICD Discharge Instructions/[facility]    1.  No showers until seen in follow up; may take sponge bath.  Keep dressing dry & in place until seen at for you follow up visit at the cardiology office.     2.  No lifting over 10 lbs with affected arm for six weeks.  3.  Do not raise affected arm above shoulder level or behind head for six weeks.  4.  Avoid excessive pushing, pulling, or twisting for six weeks.  5.  No driving for the first week.  6.  Ok to place indirect ICE pack to site for comfort.   7.  Call our office ([phone]) if you notice any increased swelling, redness, warmth, or drainage at the implant site.  8.  Needs to be seen in emergency if you develop fever > 101F or uncontrolled pain.  9.  Always check with device clinic before any planned MRI to see if device is MRI compatible.  10.  No routine dental work or cleanings for 3 months.  11.  May remove arm sling after one day, but please wear if you have trouble remembering to keep your arm down.  Please wear at night as a reminder.   12. Do not place cell phones or mobile devices directly over implanted device.   13. You will need to be seen at least twice in the device clinic for checks while the pacemaker is healing.  Expect appointment approximately one week after implant and 6-7 weeks post implant.     ICD Instructions  If you have 1 shock: if you are feeling well post shock, please notify cardiology office and be seen in office for check.  If you are feeling poorly after shock, you or someone with you needs to call 911.  If you experience 2 Shocks or more in 24 hours, please call 911.

## 2023-09-01 VITALS
HEIGHT: 69 IN | DIASTOLIC BLOOD PRESSURE: 62 MMHG | OXYGEN SATURATION: 95 % | RESPIRATION RATE: 16 BRPM | BODY MASS INDEX: 31.77 KG/M2 | SYSTOLIC BLOOD PRESSURE: 106 MMHG | TEMPERATURE: 97.3 F | WEIGHT: 214.51 LBS | HEART RATE: 71 BPM

## 2023-09-01 PROCEDURE — 770030 HCHG ROOM/CARE - EXTENDED RECOVERY EACH 15 MIN

## 2023-09-01 PROCEDURE — 700111 HCHG RX REV CODE 636 W/ 250 OVERRIDE (IP): Performed by: INTERNAL MEDICINE

## 2023-09-01 PROCEDURE — 700102 HCHG RX REV CODE 250 W/ 637 OVERRIDE(OP): Performed by: INTERNAL MEDICINE

## 2023-09-01 PROCEDURE — G0378 HOSPITAL OBSERVATION PER HR: HCPCS

## 2023-09-01 PROCEDURE — A9270 NON-COVERED ITEM OR SERVICE: HCPCS | Performed by: INTERNAL MEDICINE

## 2023-09-01 PROCEDURE — 97605 NEG PRS WND THER DME<=50SQCM: CPT

## 2023-09-01 PROCEDURE — 306591 TRAY SUTURE REMOVAL DISP: Performed by: INTERNAL MEDICINE

## 2023-09-01 RX ORDER — KETOROLAC TROMETHAMINE 30 MG/ML
15 INJECTION, SOLUTION INTRAMUSCULAR; INTRAVENOUS
Status: COMPLETED | OUTPATIENT
Start: 2023-09-01 | End: 2023-09-01

## 2023-09-01 RX ORDER — KETOROLAC TROMETHAMINE 30 MG/ML
15 INJECTION, SOLUTION INTRAMUSCULAR; INTRAVENOUS
Status: DISCONTINUED | OUTPATIENT
Start: 2023-09-01 | End: 2023-09-01

## 2023-09-01 RX ADMIN — FUROSEMIDE 20 MG: 20 TABLET ORAL at 06:04

## 2023-09-01 RX ADMIN — CARVEDILOL 12.5 MG: 12.5 TABLET, FILM COATED ORAL at 07:51

## 2023-09-01 RX ADMIN — DAPAGLIFLOZIN 10 MG: 10 TABLET, FILM COATED ORAL at 06:04

## 2023-09-01 RX ADMIN — KETOROLAC TROMETHAMINE 15 MG: 30 INJECTION, SOLUTION INTRAMUSCULAR; INTRAVENOUS at 06:39

## 2023-09-01 RX ADMIN — HYDROCODONE BITARTRATE AND ACETAMINOPHEN 1 TABLET: 5; 325 TABLET ORAL at 06:20

## 2023-09-01 RX ADMIN — SPIRONOLACTONE 25 MG: 25 TABLET ORAL at 06:04

## 2023-09-01 RX ADMIN — LISINOPRIL 40 MG: 20 TABLET ORAL at 06:04

## 2023-09-01 RX ADMIN — HYDROCODONE BITARTRATE AND ACETAMINOPHEN 1 TABLET: 5; 325 TABLET ORAL at 01:38

## 2023-09-01 ASSESSMENT — COGNITIVE AND FUNCTIONAL STATUS - GENERAL
MOBILITY SCORE: 23
CLIMB 3 TO 5 STEPS WITH RAILING: A LITTLE
CLIMB 3 TO 5 STEPS WITH RAILING: A LITTLE
SUGGESTED CMS G CODE MODIFIER DAILY ACTIVITY: CJ
HELP NEEDED FOR BATHING: A LITTLE
DRESSING REGULAR UPPER BODY CLOTHING: A LITTLE
DRESSING REGULAR UPPER BODY CLOTHING: A LITTLE
DAILY ACTIVITIY SCORE: 21
SUGGESTED CMS G CODE MODIFIER DAILY ACTIVITY: CJ
HELP NEEDED FOR BATHING: A LITTLE
SUGGESTED CMS G CODE MODIFIER MOBILITY: CI
DRESSING REGULAR LOWER BODY CLOTHING: A LITTLE
DAILY ACTIVITIY SCORE: 21
DRESSING REGULAR LOWER BODY CLOTHING: A LITTLE

## 2023-09-01 ASSESSMENT — LIFESTYLE VARIABLES
CONSUMPTION TOTAL: NEGATIVE
ON A TYPICAL DAY WHEN YOU DRINK ALCOHOL HOW MANY DRINKS DO YOU HAVE: 0
AVERAGE NUMBER OF DAYS PER WEEK YOU HAVE A DRINK CONTAINING ALCOHOL: 0
TOTAL SCORE: 0
HAVE YOU EVER FELT YOU SHOULD CUT DOWN ON YOUR DRINKING: NO
TOTAL SCORE: 0
EVER HAD A DRINK FIRST THING IN THE MORNING TO STEADY YOUR NERVES TO GET RID OF A HANGOVER: NO
HAVE PEOPLE ANNOYED YOU BY CRITICIZING YOUR DRINKING: NO
EVER FELT BAD OR GUILTY ABOUT YOUR DRINKING: NO
ALCOHOL_USE: NO
DOES PATIENT WANT TO STOP DRINKING: NO
HOW MANY TIMES IN THE PAST YEAR HAVE YOU HAD 5 OR MORE DRINKS IN A DAY: 0
TOTAL SCORE: 0

## 2023-09-01 NOTE — PROGRESS NOTES
Pt complaining of worsening right upper chest pain at sight of pacemaker placement. Reached out to on call cardiologist, Dr. Mckenzie, about stronger medication-recommended to give norco for now until another medication can be prescribed

## 2023-09-01 NOTE — CARE PLAN
The patient is Stable - Low risk of patient condition declining or worsening    Shift Goals  Clinical Goals: monitor pacemaker insertion site, monitor labs, pain control  Patient Goals: pain control, home soon    Progress made toward(s) clinical / shift goals:    Problem: Pain - Standard  Goal: Alleviation of pain or a reduction in pain to the patient’s comfort goal  Outcome: Progressing     Problem: Fall Risk  Goal: Patient will remain free from falls  Outcome: Progressing     Problem: Knowledge Deficit - Standard  Goal: Patient and family/care givers will demonstrate understanding of plan of care, disease process/condition, diagnostic tests and medications  Outcome: Progressing       Patient is not progressing towards the following goals:

## 2023-09-01 NOTE — PROGRESS NOTES
4 Eyes Skin Assessment Completed by Santa PARISI RN and FILI Becker.    Head WDL  Ears WDL  Nose WDL  Mouth WDL  Neck WDL  Breast/Chest Incision-new pacemaker placed right upper chest, old pacemaker site left upper chest with wound vac on   Shoulder Blades Redness and Blanching  Spine Redness and Blanching  (R) Arm/Elbow/Hand WDL  (L) Arm/Elbow/Hand WDL  Abdomen WDL  Groin WDL  Scrotum/Coccyx/Buttocks Redness and Blanching  (R) Leg WDL  (L) Leg WDL  (R) Heel/Foot/Toe WDL  (L) Heel/Foot/Toe WDL          Devices In Places Tele Box, Blood Pressure Cuff, and Pulse Ox      Interventions In Place Pillows    Possible Skin Injury No    Pictures Uploaded Into Epic N/A  Wound Consult Placed Yes-needed for wound vac at old pacer site   RN Wound Prevention Protocol Ordered No

## 2023-09-01 NOTE — OR NURSING
Assume care for patient in pre-op. Patient allergies, surgical procedure and NPO status verified. PIV started and IVF infusing. Belongings will remain with spouse. Call light within reach.    
Patient's  called and provided with update.  
Report called to [facility]. Plan of care discussed. VSS. Site CDI with ice pack in place. Sling in place. EKG and CXR completed. All belongings with  per patient. No complaints of nausea.   
never

## 2023-09-01 NOTE — PROGRESS NOTES
Patient discharged home with spouse.  Discharge instructions discussed with patient, patient voiced understanding.  PIV x1 removed.  No new meds to send with patient. All personal belongings sent with patient.

## 2023-09-01 NOTE — PROGRESS NOTES
Patient cleared by cardio for discharge, Wound care RN came and changed wound vac dressing, awaiting for discahrge lounge to pick patient.

## 2023-09-01 NOTE — PROGRESS NOTES
Bedside report received from night RN, pt care assumed, assessment completed. Pt is A&O4, pain 2,  paced on the monitor. Updated on POC, questions answered. Bed in lowest, locked position, treaded socks on, call light and belongings within reach.

## 2023-09-01 NOTE — WOUND TEAM
[facility]  Inpatient Services  Initial Wound and Skin Care Evaluation    Admission Date: 8/31/2023     Last order of IP CONSULT TO WOUND CARE was found on 9/1/2023 from Hospital Encounter on 8/25/2023     HPI, PMH, SH: Reviewed    Past Surgical History:   Procedure Laterality Date    CHOLECYSTECTOMY      OTHER CARDIAC SURGERY      Several pacemaker procedures.     Social History     Tobacco Use    Smoking status: Never    Smokeless tobacco: Never   Substance Use Topics    Alcohol use: Yes     Comment: very rarely     No chief complaint on file.    Diagnosis: Unspecified systolic (congestive) heart failure [I50.20]  ICD (implantable cardioverter-defibrillator) infection, initial encounter ([facility]) [T82.7XXA]    Unit where seen by Wound Team: T804/02     WOUND CONSULT RELATED TO:  L chest NPWT    WOUND TEAM PLAN OF CARE - Frequency of Follow-up:   Nursing to follow dressing orders written for wound care. Contact wound team if area fails to progress, deteriorates or with any questions/concerns if something comes up before next scheduled follow up (See below as to whether wound is following and frequency of wound follow up)   NPWT change 3 times weekly - L chest    WOUND HISTORY:   52 yo M s/p L PM removal 8/18/23       WOUND ASSESSMENT/LDA  Wound 08/16/23 Full Thickness Wound Chest Lateral;Upper Left (Active)   Date First Assessed/Time First Assessed: 08/16/23 2230   Present on Original Admission: Yes  Hand Hygiene Completed: Yes  Primary Wound Type: Full Thickness Wound  Location: Chest  Wound Orientation: Lateral;Upper  Laterality: Left      Assessments 9/1/2023 10:00 AM   Wound Image      Site Assessment Red;Undermining;Pink   Periwound Assessment Intact   Margins Defined edges;Unattached edges   Closure Surface sutures   Drainage Amount Small   Drainage Description Serosanguineous   Treatments Cleansed;Site care;Irrigation   Wound Cleansing Approved Wound Cleanser   Periwound Protectant No-sting Skin  Prep;Drape   Dressing Status Clean;Dry;Intact   Dressing Changed Changed   Dressing Cleansing/Solutions Not Applicable   Dressing Options Wound Vac   Dressing Change/Treatment Frequency Monday, Wednesday, Friday, and As Needed   NEXT Dressing Change/Treatment Date 09/04/23   NEXT Weekly Photo (Inpatient Only) 09/06/23   Wound Team Following 3x Weekly   Non-staged Wound Description Full thickness   Wound Length (cm) 1.2 cm   Wound Width (cm) 5 cm   Wound Depth (cm) 0.6 cm   Wound Surface Area (cm^2) 6 cm^2   Wound Volume (cm^3) 3.6 cm^3   Wound Healing % 87   Undermining (cm) 0.7 cm   Undermining of Wound, 1st Location From 4 o'clock;To 8 o'clock   Shape Oval   Wound Odor None   Pulses N/A   Exposed Structures None   WOUND NURSE ONLY - Time Spent with Patient (mins) 30       Negative Pressure Wound Therapy 08/21/23 Lateral Left (Active)   Placement Date/Time: 08/21/23 0924   Wound Orientation: Lateral  Laterality: Left      Assessments 9/1/2023 10:00 AM   NPWT Pump Mode / Pressure Setting Ulta;Continuous;125 mmHg   Dressing Type Small   Number of Foam Pieces Used 1   NEXT Dressing Change/Treatment Date 09/04/23   WOUND NURSE ONLY - Time Spent with Patient (mins) 30        Vascular:    GEORGE:   No results found.    Lab Values:    Lab Results   Component Value Date/Time    WBC 8.4 08/30/2023 03:59 PM    RBC 5.97 08/30/2023 03:59 PM    HEMOGLOBIN 16.8 08/30/2023 03:59 PM    HEMATOCRIT 52.1 (H) 08/30/2023 03:59 PM    CREACTPROT 15.36 (H) 08/16/2023 05:02 PM    SEDRATEWES 16 08/16/2023 05:02 PM         Culture Results show:  Recent Results (from the past 720 hour(s))   CULTURE WOUND W/ GRAM STAIN    Collection Time: 08/18/23  3:10 PM    Specimen: Wound   Result Value Ref Range    Significant Indicator POS (POS)     Source WND     Site Pacemaker Pocker     Culture Result - (A)     Gram Stain Result Few WBCs.  No organisms seen.       Culture Result Beta Streptococcus Group C  Rare growth   (A)     Culture Result  Staphylococcus epidermidis  Rare growth   (A)     Culture Result (A)      Cutibacterium (Propionibacterium) acnes  Light growth         Susceptibility    Staphylococcus epidermidis - GAGE     Azithromycin >4 Resistant mcg/mL     Clindamycin <=0.25 Sensitive mcg/mL     Cefazolin >16 Resistant mcg/mL     Cefepime >16 Resistant mcg/mL     Daptomycin <=0.5 Sensitive mcg/mL     Erythromycin >4 Resistant mcg/mL     Ampicillin/sulbactam <=8/4 Resistant mcg/mL     Vancomycin 1 Sensitive mcg/mL     Oxacillin >2 Resistant mcg/mL     Trimeth/Sulfa <=0.5/9.5 Sensitive mcg/mL     Tetracycline >8 Resistant mcg/mL       Pain Level/Medicated:  None, Tolerated without pain medication       INTERVENTIONS BY WOUND TEAM:  Chart and images reviewed. Discussed with bedside RN. All areas of concern (based on picture review, LDA review and discussion with bedside RN) have been thoroughly assessed. Documentation of areas based on significant findings. This RN in to assess patient. Performed standard wound care which includes appropriate positioning, dressing removal and non-selective debridement. Pictures and measurements obtained weekly if/when required.    Wound:  L CHEST  Preparation for Dressing removal: Dressing soaked with wound cleanser  Cleansed/Non-selectively Debrided with:  Wound cleanser and Gauze  Michelle wound: Cleansed with Wound cleanser and Gauze, Prepped with No Sting and Drape  Primary Dressin arrowhead narrow strip of half thickness black foam tucked into undermining, filled remainder of wound bed, and continued as button  Secondary (Outer) Dressing: Foam secured with tracpad and drape. NPWT resumed. No leaks noted.    Advanced Wound Care Discharge Planning  Number of Clinicians necessary to complete wound care: 1  Is patient requiring IV pain medications for dressing changes:  No   Length of time for dressing change 20 min. (This does not include chart review, pre-medication time, set up, clean up or time spent  charting.)    Interdisciplinary consultation: Patient, Bedside RN (Harriet)    EVALUATION / RATIONALE FOR TREATMENT:     Date:  09/01/23  Wound Status:  Initial evaluation    Wound teri in size from previous admission. Continued with NPWT to increase granular tissue production and assist in wound closure by secondary intention.         Goals: Steady decrease in wound area and depth weekly.    NURSING PLAN OF CARE ORDERS:  Dressing changes: See Dressing Care orders    NUTRITION RECOMMENDATIONS   Wound Team Recommendations:  N/A    DIET ORDERS (From admission to next 24h)       Start     Ordered    08/31/23 1521  Diet Order Diet: Cardiac  ALL MEALS        Question:  Diet:  Answer:  Cardiac    08/31/23 1520                    PREVENTATIVE INTERVENTIONS:    Q shift Farooq - performed per nursing policy  Q shift pressure point assessments - performed per nursing policy    Surface/Positioning  Standard/trauma mattress - Currently in Place    Anticipated discharge plans:  Outpatient Wound Center        Vac Discharge Needs:  Vac Discharge plan is purely a recommendation from wound team and not a requirement for discharge unless otherwise stated by physician.  Regular Vac in use and continued at discharge

## 2023-09-01 NOTE — PROGRESS NOTES
Assumed care of pt. Bedside report received from RN. Pt was updated on plan of care. AOx4. Pt pain level 7/10, see MAR. PT is on telemetry, 100% paced 81. Call light, phone and personal belongings in reach. Bed alarm on and working properly, bed in lowest position, and locked.

## 2023-09-06 ENCOUNTER — NON-PROVIDER VISIT (OUTPATIENT)
Dept: WOUND CARE | Facility: MEDICAL CENTER | Age: 53
End: 2023-09-06
Attending: INTERNAL MEDICINE
Payer: COMMERCIAL

## 2023-09-06 PROCEDURE — 97605 NEG PRS WND THER DME<=50SQCM: CPT

## 2023-09-06 PROCEDURE — 99211 OFF/OP EST MAY X REQ PHY/QHP: CPT

## 2023-09-06 RX ORDER — SPIRONOLACTONE 25 MG/1
12.5 TABLET ORAL DAILY
COMMUNITY
Start: 2023-06-16

## 2023-09-06 RX ORDER — DICLOFENAC SODIUM 75 MG/1
75 TABLET, DELAYED RELEASE ORAL
COMMUNITY
Start: 2023-08-23

## 2023-09-06 NOTE — CERTIFICATION
Non Provider Encounter- Full Thickness wound    HISTORY OF PRESENT ILLNESS  Wound History:  START OF CARE IN CLINIC: 9/6/2023  REFERRING PROVIDER: Kylie Negron M.D.  WOUND- Full Thickness Wound  LOCATION: Left Upper Chest    HISTORY: Patient is a 53 year old male referred to the wound clinic for wound vac management. He had an left chest ICD generator exchange at the [facility] 7/2023, then later developed an infection at the site. He went to the [facility], was transferred to [facility], and had ICD removal with Dr. Handy Deal 8/18/2023. A wound vac was placed, and had a new ICD implanted in the right chest by Dr. Handy Deal 8/31/2023.    Pertinent Labs and Diagnostics:  IMAGING:     Chest Xray 8/18/2023:  FINDINGS:     Cardiac pacing device has been removed.     HEART: Stable size.  LUNGS: No areas of air space disease are demonstrated.  PLEURA: No effusion or pneumothorax.           IMPRESSION:     No acute cardiac or pulmonary abnormalities are identified following removal of the LEFT subclavian pacing device.    VASCULAR STUDIES: N/A    LAST  WOUND CULTURE (8/18/2023):           Significant Indicator POS Positive (POS)    Source WND    Site Pacemaker Pocker    Culture Result - Abnormal     Gram Stain Result Few WBCs.   No organisms seen.    Culture Result  Abnormal   Beta Streptococcus Group C   Rare growth     Culture Result  Abnormal   Staphylococcus epidermidis   Rare growth     Culture Result  Abnormal   Cutibacterium (Propionibacterium) acnes   Light growth          Patient allergies and medications reviewed via Epic 9/6/2023.     Wound Assessment:  Negative Pressure Wound Therapy 08/21/23 Lateral Left (Active)   NPWT Pump Mode / Pressure Setting 125 mmHg;Continuous 09/06/23 0815   Dressing Type Black Foam (Regular) 09/06/23 0815   Number of Foam Pieces Used 2 09/06/23 0815   Canister Changed Yes 09/06/23 0815     Wound 08/16/23 Full Thickness Wound Chest Lateral;Upper Left (Active)   Wound Image    09/06/23 0815   Site Assessment Red;Yellow 09/06/23 0815   Periwound Assessment Intact 09/06/23 0815   Margins Unattached edges 09/06/23 0815   Drainage Amount Moderate 09/06/23 0815   Drainage Description Serosanguineous 09/06/23 0815   Treatments Cleansed 09/06/23 0815   Wound Cleansing Hypochlorus Acid 09/06/23 0815   Periwound Protectant Skin Protectant Wipes to Periwound;Drape 09/06/23 0815   Dressing Changed Changed 09/06/23 0815   Dressing Cleansing/Solutions Not Applicable 09/06/23 0815   Dressing Options Wound Vac 09/06/23 0815   Dressing Change/Treatment Frequency Monday, Wednesday, Friday, and As Needed 09/06/23 0815   Wound Team Following 3x Weekly 09/06/23 0815   Non-staged Wound Description Full thickness 09/06/23 0815   Wound Length (cm) 1 cm 09/06/23 0815   Wound Width (cm) 4 cm 09/06/23 0815   Wound Depth (cm) 0.6 cm 09/06/23 0815   Wound Surface Area (cm^2) 4 cm^2 09/06/23 0815   Wound Volume (cm^3) 2.4 cm^3 09/06/23 0815   Wound Healing % 91 09/06/23 0815   Tunneling (cm) 0 cm 09/06/23 0815   Undermining (cm) 0.4 cm 09/06/23 0815   Undermining of Wound, 1st Location From 7 o'clock;To 8 o'clock 09/06/23 0815   Wound Odor None 09/06/23 0815   Exposed Structures None 09/06/23 0815            Procedures:    -Wound vac reapplied to left chest wound using two pieces of black granufoam. Negative pressure wound therapy resumed at 125mmHg continuous pressure, no leaks noted.  -Refer to flowsheet for wound care details.       PATIENT EDUCATION  Discussed plan of care and rationale for wound vac; that battery lasts approximately 6 hours; on trouble shooting, including making sure canister is seated properly and not cracked, that clamps are open, and signs of leakage including machine alarming and screen showing that pressure is not at 125 mmHg; to seal leaks by applying more drape tape to dressing if necessary; that if unable to fix leak for 2 hours, to remove all tape and foam and cover wound with moist  gauze dressing, and to notify the wound clinic during business hours; to come to appointments 3x/week; to keep dressing dry and intact; to bring supplies to each appointment.

## 2023-09-06 NOTE — PATIENT INSTRUCTIONS
-You have a wound vac at 125 mmHg continuous pressure with black foam. The dressing will be changed every Monday, Wednesday, and Friday at the wound clinic.    -Your wound vac battery lasts approximately six hours. Charge your wound vac machine overnight and when you are at home.    -If your wound vac has a leak, you can seal the leak with additional wound vac drape/tape.    -If you are having issues with your wound vac, please consider patching leaks, changing the canister, or calling [phone] for troubleshooting. If the wound vac has been off or non-operational for over 2 hours, call wound care center to inform them and remove all dressings including black foam and replace with gauze moistened with normal saline.     -Should you experience any significant changes in your wound, such as signs of infection (increasing redness, swelling, localized heat, increased pain, fever > 101 F, chills) or have any questions regarding your home care instructions, please contact the wound center at [phone]. If after hours, contact your primary care physician or go to the hospital emergency room.

## 2023-09-07 ENCOUNTER — NON-PROVIDER VISIT (OUTPATIENT)
Dept: CARDIOLOGY | Facility: MEDICAL CENTER | Age: 53
End: 2023-09-07

## 2023-09-07 ENCOUNTER — TELEPHONE (OUTPATIENT)
Dept: CARDIOLOGY | Facility: MEDICAL CENTER | Age: 53
End: 2023-09-07
Payer: COMMERCIAL

## 2023-09-07 ENCOUNTER — NON-PROVIDER VISIT (OUTPATIENT)
Dept: CARDIOLOGY | Facility: MEDICAL CENTER | Age: 53
End: 2023-09-07
Attending: INTERNAL MEDICINE
Payer: COMMERCIAL

## 2023-09-07 DIAGNOSIS — I50.20 HFREF (HEART FAILURE WITH REDUCED EJECTION FRACTION) (HCC): ICD-10-CM

## 2023-09-07 DIAGNOSIS — T82.7XXA ICD (IMPLANTABLE CARDIOVERTER-DEFIBRILLATOR) INFECTION, INITIAL ENCOUNTER (HCC): ICD-10-CM

## 2023-09-07 PROCEDURE — 93283 PRGRMG EVAL IMPLANTABLE DFB: CPT | Performed by: INTERNAL MEDICINE

## 2023-09-07 NOTE — TELEPHONE ENCOUNTER
Patient seen in clinic today--device is functioning appropriately.  Patient is s/p new AICD implant with attempted LV lead-not successful.  Patient has c/o increased discomfort in shoulder area and requesting pain meds at this time.     Wound site appears to be healing well.

## 2023-09-08 ENCOUNTER — NON-PROVIDER VISIT (OUTPATIENT)
Dept: WOUND CARE | Facility: MEDICAL CENTER | Age: 53
End: 2023-09-08
Attending: INTERNAL MEDICINE
Payer: COMMERCIAL

## 2023-09-08 PROCEDURE — 97605 NEG PRS WND THER DME<=50SQCM: CPT

## 2023-09-08 NOTE — PATIENT INSTRUCTIONS
-Keep your wound dressing clean, dry, and intact.    -Change your dressing if it becomes soiled, soaked, or falls off.    -Wound vac may not have any drainage in tube or cannister & it will still be working.   Change cannister if it does become full by pressing tab on side of machine to remove canister and snap on new one. Full canister can be thrown in the trash. If cannister fills with bright red blood - go to ER. Dressing will be changed every MWF at the wound clini.  If you are having issues with your wound VAC, please consider patching leaks, changing the canister, or calling [phone] for troubleshooting. If the wound VAC has been off or un-operational for over 2 hours, call wound care center to inform them and remove all dressings including black foam and replace with normal saline damp gauze.     -Should you experience any significant changes in your wound(s), such as infection (redness, swelling, localized heat, increased pain, fever > 101 F, chills) or have any questions regarding your home care instructions, please contact the wound center at [phone]. If after hours, contact your primary care physician or go to the hospital emergency room.

## 2023-09-08 NOTE — PROGRESS NOTES
NPWT < 50 sq cm dressing changed this visit, 2 pieces of foam removed. 1 new piece of black foam placed to wound bed and used for tracpad. Pump set to 125 mmhg, continuous suction. No leaks detected. Refer to flow sheet for wound care details. Patient tolerated the procedure well.

## 2023-09-08 NOTE — TELEPHONE ENCOUNTER
Phone Number Called: [phone]    Call outcome: Spoke to patient regarding message below.    Message: Called to inform patient we do not prescribe pain medications, did recommend patient trying OTC pail reliefs. Patient stated he is feeling much better and verbalized understanding.

## 2023-09-08 NOTE — PROGRESS NOTES
Wound site is healing well.  Patient advised to watch for increased redness, swelling, oozing or fever--verbalized understanding.    Reviewed remote monitoring and billing with patient--verbalized understanding.

## 2023-09-11 ENCOUNTER — NON-PROVIDER VISIT (OUTPATIENT)
Dept: WOUND CARE | Facility: MEDICAL CENTER | Age: 53
End: 2023-09-11
Attending: INTERNAL MEDICINE
Payer: COMMERCIAL

## 2023-09-11 DIAGNOSIS — T82.7XXA PACEMAKER INFECTION, INITIAL ENCOUNTER (HCC): ICD-10-CM

## 2023-09-11 PROCEDURE — 97605 NEG PRS WND THER DME<=50SQCM: CPT

## 2023-09-11 PROCEDURE — 97602 WOUND(S) CARE NON-SELECTIVE: CPT

## 2023-09-11 NOTE — PATIENT INSTRUCTIONS
-Keep your wound dressing clean, dry, and intact.    -Change your dressing if it becomes soiled, soaked, or falls off.    -Wound vac may not have any drainage in tube or cannister & it will still be working.   Change cannister if it does become full by pressing tab on side of machine to remove canister and snap on new one. Full canister can be thrown in the trash. If cannister fills with bright red blood - go to ER. Dressing will be changed every MWF at the wound clini.  If you are having issues with your wound VAC, please consider patching leaks, changing the canister, or calling [phone] for troubleshooting. If the wound VAC has been off or un-operational for over 2 hours, call wound care center to inform them and remove all dressings including black foam and replace with normal saline damp gauze.     -Should you experience any significant changes in your wound(s), such as infection (redness, swelling, localized heat, increased pain, fever > 101 F, chills) or have any questions regarding your home care instructions, please contact the wound center at [phone]. If after hours, contact your primary care physician or go to the hospital emergency room.

## 2023-09-11 NOTE — PROGRESS NOTES
Patient will be traveling out of state next week. Would like to switch to and advanced dressing rather than NPWT for when he is out of town.     Discussed options, would like to keep VAC in place until Friday clinic visit to maximize healing, then transition to advanced wound dressing. Patient verbalized understanding.

## 2023-09-11 NOTE — PROCEDURES
Non-selective debridement to left upper chest wound and jones-wound using normal saline and gauze to remove biofilm and non-viable tissue.     - NPWT dressing changed to left upper chest wound, < 50 cm ².   - 1 piece of black foam removed by CNA. No residual foam noted in wound bed.   - 2 piece(s) of black foam used.   - Initiated continuous suction at 125 mm Hg, no leaks noted.

## 2023-09-13 ENCOUNTER — NON-PROVIDER VISIT (OUTPATIENT)
Dept: WOUND CARE | Facility: MEDICAL CENTER | Age: 53
End: 2023-09-13
Attending: INTERNAL MEDICINE
Payer: COMMERCIAL

## 2023-09-13 PROCEDURE — 97602 WOUND(S) CARE NON-SELECTIVE: CPT

## 2023-09-13 NOTE — PROGRESS NOTES
Wound Care Procedures 9/13/2023:  Nonselective debridement with normal saline moistened gauze to remove biofilm from left chest wound.    Patient going out of town and will be back 9/23/2023. Advised patient to bring his wound vac to his next appointment in the event that it needs to be applied, but also advised patient that wound vac may be discontinued at his next visit if his wound continues to improve without the wound vac.

## 2023-09-13 NOTE — PATIENT INSTRUCTIONS
-Keep dressings clean and dry. Change dressings every 3-4 days, and if they become over saturated, soiled or fall off.     -If you need to change your dressings at home: Wash your wound with normal saline, wound cleanser, or unscented soap and water prior to applying your new dressings. Please avoid cleansing with hydrogen peroxide or rubbing alcohol.    -Should you experience any significant changes in your wound, such as signs of infection (increasing redness, swelling, localized heat, increased pain, fever > 101 F, chills) or have any questions regarding your home care instructions, please contact the wound center at [phone]. If after hours, contact your primary care physician or go to the hospital emergency room.     -If you are 5 or more minutes late for an appointment, we reserve the right to cancel and reschedule that appointment. Additionally, if you are habitually late or not showing (3 late cancellations and/or no shows), we reserve the right to cancel your remaining appointments and it will be your responsibility to obtain a new referral if services are still needed.

## 2023-09-14 PROCEDURE — 93283 PRGRMG EVAL IMPLANTABLE DFB: CPT | Mod: 26 | Performed by: INTERNAL MEDICINE

## 2023-09-15 ENCOUNTER — APPOINTMENT (OUTPATIENT)
Dept: WOUND CARE | Facility: MEDICAL CENTER | Age: 53
End: 2023-09-15
Attending: INTERNAL MEDICINE
Payer: COMMERCIAL

## 2023-09-18 ENCOUNTER — APPOINTMENT (OUTPATIENT)
Dept: WOUND CARE | Facility: MEDICAL CENTER | Age: 53
End: 2023-09-18
Attending: INTERNAL MEDICINE
Payer: COMMERCIAL

## 2023-09-20 ENCOUNTER — APPOINTMENT (OUTPATIENT)
Dept: WOUND CARE | Facility: MEDICAL CENTER | Age: 53
End: 2023-09-20
Attending: INTERNAL MEDICINE
Payer: COMMERCIAL

## 2023-09-22 ENCOUNTER — APPOINTMENT (OUTPATIENT)
Dept: WOUND CARE | Facility: MEDICAL CENTER | Age: 53
End: 2023-09-22
Attending: INTERNAL MEDICINE
Payer: COMMERCIAL

## 2023-09-23 NOTE — DISCHARGE SUMMARY
Admit date    8/31/2023    Discharge date    9/1/2023      Discharge diagnosis    -Underwent successful AICD implantation 8/31/2023, unsuccessful CS lead placement secondary to unfavorable anatomy.  -Chronic heart failure with recovered EF.  -Status post CRT-D extraction secondary to pocket infection 8/18/2023.  -Wound VAC in situ.  -Underwent GEN change 7/2023.  Postprocedure hematoma and pocket dehiscence.  -Prior ventricular tachycardia with subsequent arrest requiring implantable cardiac defibrillator.  (6 to 7 years ago)  -Nonischemic cardiomyopathy with nonobstructive CAD            Procedure  8/31/23  1. Successful automatic implantable cardioverter defibrillator implant.  2. Unsuccessful CS lead placement due to unfavorable anatomy    HPI/Hospital course    53-year-old male with prior history of VT cardiac arrest requiring AICD 6 7 years ago underwent GEN change 7/2023 complicated by pocket infection underwent CRT-D extraction 8/18/2023 with wound VAC placement currently presents to undergo AICD implantation.    8/31/2023  Underwent successful ICD implantation, unsuccessful CS lead placement secondary to difficult anatomy.    9/1/2023  Patient was overnight stay for further observation post ICD implantation which completed later in the afternoon and repeat device check 9/1/2023.  Device interrogated this morning functioning well, chest x-ray without evidence of pneumothorax.  Wound VAC changed prior to discharge.  Close follow-up in device clinic in 1 week for wound check and device interrogation.  pocket site uncomplicated.    Labs reviewed    CBC and CMP in good order    Discharge meds       Medication List        CONTINUE taking these medications        Instructions   carvedilol 12.5 MG Tabs  Commonly known as: Coreg   Take 12.5 mg by mouth 2 times a day with meals.  Dose: 12.5 mg     Empagliflozin 25 MG Tabs   Take 12.5 mg by mouth 2 times a day. 12.5 MG = 1/2 TAB  Dose: 12.5 mg     furosemide 20 MG  Tabs  Commonly known as: Lasix   Take 20 mg by mouth every morning.  Dose: 20 mg     Lipitor 40 MG Tabs  Generic drug: atorvastatin   Take 40 mg by mouth every evening.  Dose: 40 mg     lisinopril 40 MG tablet  Commonly known as: Prinivil   Take 40 mg by mouth every morning.  Dose: 40 mg     meloxicam 15 MG tablet  Commonly known as: Mobic   Take 15 mg by mouth every morning.  Dose: 15 mg     MULTIVITAMIN ADULT PO   Take 1 Tablet by mouth every day.  Dose: 1 Tablet     VITAMIN D PO   Take 1 Tablet by mouth every day.  Dose: 1 Tablet            ASK your doctor about these medications        Instructions   amoxicillin 500 MG Caps  Commonly known as: Amoxil  Ask about: Should I take this medication?   Doctor's comments: Per infectious disease recommendations  Take 2 Capsules by mouth 3 times a day for 10 days.  Dose: 1,000 mg               Discharge instructions    Follow-up appointment in device clinic for wound check and device interrogation in 1 week.    Plan for repeat echocardiogram in 2 to 3 months and if LV function remains compromised then consider referral for epicardial lead.      ICD restrictions reviewed with patient. Do not raise affected arm above head or behind back for six weeks. May remove arm sling after 24 hrs, please wear if trouble remembering arm limitations and prn at night. No heavy lifting/pushing for six weeks. No driving for first week. No showers first week. Keep dressing on, clean, and dry until sees us in follow up. Wound care reviewed. Instructed to report s/s of infection such as warmth/redness/drainage/swelling at site or fever/chills.  ICD therapy reviewed with patient: 1 shock: if feeling fine can notify cardiology office and be seen for interrogation.  If feeling poorly needs to call 911.  2 Shocks in 24 hours: call 911.  Patient verbalizes understanding.

## 2023-09-25 ENCOUNTER — OFFICE VISIT (OUTPATIENT)
Dept: WOUND CARE | Facility: MEDICAL CENTER | Age: 53
End: 2023-09-25
Attending: INTERNAL MEDICINE
Payer: COMMERCIAL

## 2023-09-25 VITALS
RESPIRATION RATE: 16 BRPM | TEMPERATURE: 96.5 F | SYSTOLIC BLOOD PRESSURE: 117 MMHG | DIASTOLIC BLOOD PRESSURE: 81 MMHG | HEART RATE: 99 BPM | OXYGEN SATURATION: 95 %

## 2023-09-25 DIAGNOSIS — T82.7XXA PACEMAKER INFECTION, INITIAL ENCOUNTER (HCC): ICD-10-CM

## 2023-09-25 DIAGNOSIS — S21.102D OPEN WOUND OF LEFT CHEST WALL, SUBSEQUENT ENCOUNTER: ICD-10-CM

## 2023-09-25 PROCEDURE — 99214 OFFICE O/P EST MOD 30 MIN: CPT

## 2023-09-25 PROCEDURE — 3074F SYST BP LT 130 MM HG: CPT | Performed by: STUDENT IN AN ORGANIZED HEALTH CARE EDUCATION/TRAINING PROGRAM

## 2023-09-25 PROCEDURE — 3079F DIAST BP 80-89 MM HG: CPT | Performed by: STUDENT IN AN ORGANIZED HEALTH CARE EDUCATION/TRAINING PROGRAM

## 2023-09-25 PROCEDURE — 17250 CHEM CAUT OF GRANLTJ TISSUE: CPT | Performed by: STUDENT IN AN ORGANIZED HEALTH CARE EDUCATION/TRAINING PROGRAM

## 2023-09-25 PROCEDURE — 17250 CHEM CAUT OF GRANLTJ TISSUE: CPT

## 2023-09-25 ASSESSMENT — ENCOUNTER SYMPTOMS
FEVER: 0
CHILLS: 0

## 2023-09-26 NOTE — PROGRESS NOTES
Provider Encounter- Full Thickness wound    HISTORY OF PRESENT ILLNESS  Wound History:    START OF CARE IN CLINIC: 9/6/2023    REFERRING PROVIDER: Kylie Zepeda      WOUND- Full Thickness Wound   LOCATION: Left anterior chest wall   HISTORY:  53M with PMHx of HFrEF, HTN, CKD, patient underwent left chest ICD generator exchange at [facility] 7/2023. 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 He underwent ICD removal 8/18 and placement of right chest ICD on 8/31/2023. Patient was referred to [facility] for wound care and management of wound VAC.    Pertinent Medical History: See above     TOBACCO USE:  Denies ever using    Patient's problem list, allergies, and current medications reviewed and updated in Epic    Interval History:  9/25/2023: Clinic visit with Waqar Velazquez MD. Patient reports doing well. He has returned from trip and wound is measuring smaller. Wound is hypergranular. Patient will send wound VAC back to . 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     REVIEW OF SYSTEMS:   Review of Systems   Constitutional:  Negative for chills, fever and malaise/fatigue.   Skin:         Open wound left chest wall       PHYSICAL EXAMINATION:   /81 (BP Location: Right arm, Patient Position: Sitting)   Pulse 99   Temp 35.8 °C (96.5 °F) (Temporal)   Resp 16   SpO2 95%     Physical Exam  Constitutional:       General: He is not in acute distress.  Cardiovascular:      Rate and Rhythm: Normal rate.   Pulmonary:      Effort: Pulmonary effort is normal. No respiratory distress.   Skin:     Comments: Left anterior chest wall wound - Wound measuring smaller, now hypergranular. No evidence of infection.   Neurological:      Mental Status: He is alert.         WOUND ASSESSMENT  Wound 08/16/23 Full Thickness Wound Chest Lateral;Upper Left (Active)   Wound Image    09/25/23 1700   Site Assessment Red;Hypergranulation 09/25/23 1700   Periwound  "Assessment Scar tissue;Flaky 09/25/23 1700   Margins Attached edges 09/25/23 1700   Drainage Amount None 09/25/23 1700   Drainage Description Serosanguineous 09/13/23 1610   Treatments Topical Lidocaine;Cleansed;Provider debridement;Site care 09/25/23 1700   Wound Cleansing Hypochlorus Acid 09/25/23 1700   Periwound Protectant Skin Protectant Wipes to Periwound 09/25/23 1700   Dressing Changed Changed 09/25/23 1700   Dressing Cleansing/Solutions Normal Saline 09/25/23 1700   Dressing Options Hydrofera Blue Classic;Hypafix Tape 09/25/23 1700   Dressing Change/Treatment Frequency Every 72 hrs, and As Needed 09/25/23 1700   Wound Team Following 3x Weekly 09/11/23 1600   Non-staged Wound Description Full thickness 09/25/23 1700   Wound Length (cm) 0.5 cm 09/25/23 1700   Wound Width (cm) 1 cm 09/25/23 1700   Wound Depth (cm) 0.3 cm 09/13/23 1610   Wound Surface Area (cm^2) 0.5 cm^2 09/25/23 1700   Wound Volume (cm^3) 0.651 cm^3 09/13/23 1610   Post-Procedure Length (cm) 0.5 cm 09/25/23 1700   Post-Procedure Width (cm) 1.8 cm 09/25/23 1700   Post-Procedure Depth (cm) 0.1 cm 09/25/23 1700   Post-Procedure Surface Area (cm^2) 0.9 cm^2 09/25/23 1700   Post-Procedure Volume (cm^3) 0.09 cm^3 09/25/23 1700   Wound Healing % 98 09/13/23 1610   Tunneling (cm) 0 cm 09/25/23 1700   Undermining (cm) 0 cm 09/25/23 1700   Undermining of Wound, 1st Location From 9 o'clock;To 3 o'clock 09/11/23 1600   Wound Odor None 09/25/23 1700   Exposed Structures None 09/25/23 1700   Number of days: 40       Wound 08/31/23 Chest Upper Right (Active)   Number of days: 25       PROCEDURE:  Chemical cautery of hypergranulation tissue left chest wall  - Left anterior chest wall wound is hypergranular  - Used silver nitrate to chemically cauterize  - Neutralized with saline  - Tolerated without bleeding or complication.      Pertinent Labs and Diagnostics:    Labs:     A1c: No results found for: \"HBA1C\"       IMAGING: None    VASCULAR STUDIES: " None    LAST  WOUND CULTURE:  DATE :   Lab Results   Component Value Date/Time    CULTRSULT - (A) 08/18/2023 03:10 PM    CULTRSULT Beta Streptococcus Group C  Rare growth   (A) 08/18/2023 03:10 PM    CULTRSULT Staphylococcus epidermidis  Rare growth   (A) 08/18/2023 03:10 PM    CULTRSULT (A) 08/18/2023 03:10 PM     Cutibacterium (Propionibacterium) acnes  Light growth           ASSESSMENT AND PLAN:     1. Open wound of left chest wall, subsequent encounter  S/p pacemaker infection and removal 8/18/2023.    9/25/2023: Wound measuring smaller, hypergranular  - Chemical cautery to hypergranulation tissue performed today. Tolerated well  - Will use hydrofera Blue Ready to help manage hypergranulation tissue  - Wound too small for wound VAC, recommend he return to   - Patient was former medical  and comfortable changing own dressing, return to clinic in 2 weeks.   Wound Care: Hydrofera Blue Ready, Tape    2. Pacemaker infection, initial encounter ([facility])    9/25/2023  - No evidence of infection today  - Continue to monitor for infection      PATIENT EDUCATION  - Importance of adequate nutrition for wound healing  -Advised to go to ER for any increased redness, swelling, drainage, or odor, or if patient develops fever, chills, nausea or vomiting.     My total time spent caring for the patient on the day of the encounter was 30 minutes.   This does not include time spent on separately billable procedures/tests.       Please note that this note may have been created using voice recognition software. I have worked with technical experts from [employer] to optimize the interface.  I have made every reasonable attempt to correct obvious errors, but there may be errors of grammar and possibly content that I did not discover before finalizing the note.    N

## 2023-09-26 NOTE — PATIENT INSTRUCTIONS
-Keep your wound dressing clean, dry, and intact.    -Change your dressing if it becomes soiled, soaked, or falls off.      -Should you experience any significant changes in your wound(s), such as infection (redness, swelling, localized heat, increased pain, fever > 101 F, chills) or have any questions regarding your home care instructions, please contact the wound center at [phone]. If after hours, contact your primary care physician or go to the hospital emergency room.

## 2023-09-27 ENCOUNTER — APPOINTMENT (OUTPATIENT)
Dept: WOUND CARE | Facility: MEDICAL CENTER | Age: 53
End: 2023-09-27
Attending: INTERNAL MEDICINE
Payer: COMMERCIAL

## 2023-09-29 ENCOUNTER — APPOINTMENT (OUTPATIENT)
Dept: WOUND CARE | Facility: MEDICAL CENTER | Age: 53
End: 2023-09-29
Attending: INTERNAL MEDICINE
Payer: COMMERCIAL

## 2023-10-09 ENCOUNTER — OFFICE VISIT (OUTPATIENT)
Dept: WOUND CARE | Facility: MEDICAL CENTER | Age: 53
End: 2023-10-09
Attending: INTERNAL MEDICINE
Payer: COMMERCIAL

## 2023-10-09 VITALS
SYSTOLIC BLOOD PRESSURE: 107 MMHG | TEMPERATURE: 96.7 F | OXYGEN SATURATION: 94 % | DIASTOLIC BLOOD PRESSURE: 86 MMHG | HEART RATE: 92 BPM | RESPIRATION RATE: 16 BRPM

## 2023-10-09 DIAGNOSIS — S21.102D OPEN WOUND OF LEFT CHEST WALL, SUBSEQUENT ENCOUNTER: ICD-10-CM

## 2023-10-09 DIAGNOSIS — T82.7XXA PACEMAKER INFECTION, INITIAL ENCOUNTER (HCC): ICD-10-CM

## 2023-10-09 PROCEDURE — 99212 OFFICE O/P EST SF 10 MIN: CPT

## 2023-10-09 PROCEDURE — 3079F DIAST BP 80-89 MM HG: CPT | Performed by: STUDENT IN AN ORGANIZED HEALTH CARE EDUCATION/TRAINING PROGRAM

## 2023-10-09 PROCEDURE — 99211 OFF/OP EST MAY X REQ PHY/QHP: CPT

## 2023-10-09 PROCEDURE — 3074F SYST BP LT 130 MM HG: CPT | Performed by: STUDENT IN AN ORGANIZED HEALTH CARE EDUCATION/TRAINING PROGRAM

## 2023-10-09 PROCEDURE — 99212 OFFICE O/P EST SF 10 MIN: CPT | Performed by: STUDENT IN AN ORGANIZED HEALTH CARE EDUCATION/TRAINING PROGRAM

## 2023-10-09 NOTE — PATIENT INSTRUCTIONS
-Keep your wound dressing clean, dry, and intact.    -Change your dressing if it becomes soiled, soaked, or falls off.    - Resolved wound be fragile for a few days, bathe and dry area gently, only ever regains a maximum of 80% of the tensile strength of the surrounding skin, remodeling of scar can continue for 6mo - a year. Contact PCP for a referral back her if any problems with area opening and draining again.    -Should you experience any significant changes in your wound(s), such as infection (redness, swelling, localized heat, increased pain, fever > 101 F, chills) or have any questions regarding your home care instructions, please contact the wound center at [phone]. If after hours, contact your primary care physician or go to the hospital emergency room.     .

## 2023-10-10 NOTE — PROGRESS NOTES
Provider Encounter- Full Thickness wound    HISTORY OF PRESENT ILLNESS  Wound History:    START OF CARE IN CLINIC: 9/6/2023    REFERRING PROVIDER: Kylie Zepeda      WOUND- Full Thickness Wound   LOCATION: Left anterior chest wall   HISTORY:  53M with PMHx of HFrEF, HTN, CKD, patient underwent left chest ICD generator exchange at [facility] 7/2023. 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 He underwent ICD removal 8/18 and placement of right chest ICD on 8/31/2023. Patient was referred to [facility] for wound care and management of wound VAC.    Pertinent Medical History: See above     TOBACCO USE:  Denies ever using    Patient's problem list, allergies, and current medications reviewed and updated in Epic    Interval History:  9/25/2023: Clinic visit with Waqar Velazquez MD. Patient reports doing well. He has returned from trip and wound is measuring smaller. Wound is hypergranular. Patient will send wound VAC back to . 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     10/9/2023: Clinic visit with Waqar Velazquez MD. Patient denies any complaints. He denies any drainage this week. Wound is covered with crusting.    REVIEW OF SYSTEMS:   Unchanged from previous wound clinic assessment on 9/25/2023, except as noted in interval history above    PHYSICAL EXAMINATION:   /86   Pulse 92   Temp 35.9 °C (96.7 °F) (Temporal)   Resp 16   SpO2 94%     Physical Exam  Constitutional:       General: He is not in acute distress.  Cardiovascular:      Rate and Rhythm: Normal rate.   Pulmonary:      Effort: Pulmonary effort is normal. No respiratory distress.   Skin:     Comments: Left anterior chest wall wound - Wound covered with crust. No evidence of active drainage or infection.   Neurological:      Mental Status: He is alert.         WOUND ASSESSMENT  Wound 08/31/23 Chest Upper Right (Active)   Number of days: 39     Left chest wound -  "resolved    PROCEDURE:    - Removed crust from wound, was resolved. No need for debridement.      Pertinent Labs and Diagnostics:    Labs:     A1c: No results found for: \"HBA1C\"       IMAGING: None    VASCULAR STUDIES: None    LAST  WOUND CULTURE:  DATE :   Lab Results   Component Value Date/Time    CULTRSULT - (A) 08/18/2023 03:10 PM    CULTRSULT Beta Streptococcus Group C  Rare growth   (A) 08/18/2023 03:10 PM    CULTRSULT Staphylococcus epidermidis  Rare growth   (A) 08/18/2023 03:10 PM    CULTRSULT (A) 08/18/2023 03:10 PM     Cutibacterium (Propionibacterium) acnes  Light growth           ASSESSMENT AND PLAN:     1. Open wound of left chest wall, subsequent encounter  S/p pacemaker infection and removal 8/18/2023.    10/9/2023:   - Wound has resolved with new scar tissue and epithelium  - No evidence of infection  - Will apply silicone adhesive foam to protect new skin  - Patient feels comfortable managing going forward  - Recommend that in 1 week he can start to apply moisturizer to keep new skin supple  - As wound has resolved, will be discharged from clinic. He was counseled that he will need new referral to return to wound clinic if needed.    2. Pacemaker infection, initial encounter ([facility])  - No evidence of infection today      PATIENT EDUCATION  - Importance of adequate nutrition for wound healing  -Advised to go to ER for any increased redness, swelling, drainage, or odor, or if patient develops fever, chills, nausea or vomiting.     My total time spent caring for the patient on the day of the encounter was 10 minutes, reviewing history, assessment, counseling and education, and coordination of care.  This does not include time spent on separately billable procedures/tests.        Please note that this note may have been created using voice recognition software. I have worked with technical experts from [employer] to optimize the interface.  I have made every reasonable attempt to correct obvious " errors, but there may be errors of grammar and possibly content that I did not discover before finalizing the note.    N

## 2023-10-16 ENCOUNTER — TELEPHONE (OUTPATIENT)
Dept: CARDIOLOGY | Facility: MEDICAL CENTER | Age: 53
End: 2023-10-16
Payer: COMMERCIAL

## 2023-10-19 ENCOUNTER — HOSPITAL ENCOUNTER (OUTPATIENT)
Dept: RADIOLOGY | Facility: MEDICAL CENTER | Age: 53
End: 2023-10-19
Attending: INTERNAL MEDICINE
Payer: COMMERCIAL

## 2023-10-19 VITALS — OXYGEN SATURATION: 92 % | HEART RATE: 86 BPM | DIASTOLIC BLOOD PRESSURE: 66 MMHG | SYSTOLIC BLOOD PRESSURE: 114 MMHG

## 2023-10-19 DIAGNOSIS — M25.551 RIGHT HIP PAIN: ICD-10-CM

## 2023-10-19 PROCEDURE — 73721 MRI JNT OF LWR EXTRE W/O DYE: CPT | Mod: RT

## 2023-10-19 NOTE — PROGRESS NOTES
Pt's PPM set to MRI safe mode by [facility] prior to MRI. Patient's device turned off per rep. Patient educated to use emergency call button should he experience any unusual chest pain/pressure/burning/ etc. Patient verbalized understanding. During MRI scan, pt monitored with BP, ECG, and SPO2. Pt tolerated scan well. Discharged ambulatory after device reset to pre-MRI mode by rep